# Patient Record
Sex: MALE | Race: OTHER | NOT HISPANIC OR LATINO | Employment: FULL TIME | URBAN - METROPOLITAN AREA
[De-identification: names, ages, dates, MRNs, and addresses within clinical notes are randomized per-mention and may not be internally consistent; named-entity substitution may affect disease eponyms.]

---

## 2020-01-16 ENCOUNTER — TELEPHONE (OUTPATIENT)
Dept: SCHEDULING | Facility: IMAGING CENTER | Age: 55
End: 2020-01-16

## 2020-02-26 ENCOUNTER — TELEPHONE (OUTPATIENT)
Dept: MEDICAL GROUP | Facility: PHYSICIAN GROUP | Age: 55
End: 2020-02-26

## 2020-02-26 ENCOUNTER — HOSPITAL ENCOUNTER (OUTPATIENT)
Dept: LAB | Facility: MEDICAL CENTER | Age: 55
End: 2020-02-26
Attending: INTERNAL MEDICINE
Payer: COMMERCIAL

## 2020-02-26 ENCOUNTER — OFFICE VISIT (OUTPATIENT)
Dept: MEDICAL GROUP | Facility: PHYSICIAN GROUP | Age: 55
End: 2020-02-26
Payer: COMMERCIAL

## 2020-02-26 VITALS
OXYGEN SATURATION: 97 % | TEMPERATURE: 98.5 F | HEART RATE: 69 BPM | SYSTOLIC BLOOD PRESSURE: 118 MMHG | HEIGHT: 69 IN | DIASTOLIC BLOOD PRESSURE: 80 MMHG | WEIGHT: 199.8 LBS | BODY MASS INDEX: 29.59 KG/M2

## 2020-02-26 DIAGNOSIS — E11.9 TYPE 2 DIABETES MELLITUS WITHOUT COMPLICATION, WITHOUT LONG-TERM CURRENT USE OF INSULIN (HCC): ICD-10-CM

## 2020-02-26 LAB
ALBUMIN SERPL BCP-MCNC: 4.1 G/DL (ref 3.2–4.9)
ALBUMIN/GLOB SERPL: 1.2 G/DL
ALP SERPL-CCNC: 68 U/L (ref 30–99)
ALT SERPL-CCNC: 18 U/L (ref 2–50)
ANION GAP SERPL CALC-SCNC: 10 MMOL/L (ref 0–11.9)
AST SERPL-CCNC: 18 U/L (ref 12–45)
BASOPHILS # BLD AUTO: 0.8 % (ref 0–1.8)
BASOPHILS # BLD: 0.07 K/UL (ref 0–0.12)
BILIRUB SERPL-MCNC: 0.3 MG/DL (ref 0.1–1.5)
BUN SERPL-MCNC: 15 MG/DL (ref 8–22)
CALCIUM SERPL-MCNC: 9.2 MG/DL (ref 8.5–10.5)
CHLORIDE SERPL-SCNC: 108 MMOL/L (ref 96–112)
CHOLEST SERPL-MCNC: 127 MG/DL (ref 100–199)
CO2 SERPL-SCNC: 22 MMOL/L (ref 20–33)
CREAT SERPL-MCNC: 0.96 MG/DL (ref 0.5–1.4)
CREAT UR-MCNC: 160.5 MG/DL
EOSINOPHIL # BLD AUTO: 0.32 K/UL (ref 0–0.51)
EOSINOPHIL NFR BLD: 3.9 % (ref 0–6.9)
ERYTHROCYTE [DISTWIDTH] IN BLOOD BY AUTOMATED COUNT: 42.3 FL (ref 35.9–50)
EST. AVERAGE GLUCOSE BLD GHB EST-MCNC: 177 MG/DL
FASTING STATUS PATIENT QL REPORTED: NORMAL
GLOBULIN SER CALC-MCNC: 3.4 G/DL (ref 1.9–3.5)
GLUCOSE SERPL-MCNC: 130 MG/DL (ref 65–99)
HBA1C MFR BLD: 7.8 % (ref 0–5.6)
HCT VFR BLD AUTO: 48.4 % (ref 42–52)
HDLC SERPL-MCNC: 35 MG/DL
HGB BLD-MCNC: 16.3 G/DL (ref 14–18)
IMM GRANULOCYTES # BLD AUTO: 0.01 K/UL (ref 0–0.11)
IMM GRANULOCYTES NFR BLD AUTO: 0.1 % (ref 0–0.9)
LDLC SERPL CALC-MCNC: 72 MG/DL
LYMPHOCYTES # BLD AUTO: 2.57 K/UL (ref 1–4.8)
LYMPHOCYTES NFR BLD: 31 % (ref 22–41)
MCH RBC QN AUTO: 30.1 PG (ref 27–33)
MCHC RBC AUTO-ENTMCNC: 33.7 G/DL (ref 33.7–35.3)
MCV RBC AUTO: 89.3 FL (ref 81.4–97.8)
MICROALBUMIN UR-MCNC: 1.3 MG/DL
MICROALBUMIN/CREAT UR: 8 MG/G (ref 0–30)
MONOCYTES # BLD AUTO: 0.54 K/UL (ref 0–0.85)
MONOCYTES NFR BLD AUTO: 6.5 % (ref 0–13.4)
NEUTROPHILS # BLD AUTO: 4.77 K/UL (ref 1.82–7.42)
NEUTROPHILS NFR BLD: 57.7 % (ref 44–72)
NRBC # BLD AUTO: 0 K/UL
NRBC BLD-RTO: 0 /100 WBC
PLATELET # BLD AUTO: 242 K/UL (ref 164–446)
PMV BLD AUTO: 10.1 FL (ref 9–12.9)
POTASSIUM SERPL-SCNC: 4.1 MMOL/L (ref 3.6–5.5)
PROT SERPL-MCNC: 7.5 G/DL (ref 6–8.2)
RBC # BLD AUTO: 5.42 M/UL (ref 4.7–6.1)
SODIUM SERPL-SCNC: 140 MMOL/L (ref 135–145)
TRIGL SERPL-MCNC: 100 MG/DL (ref 0–149)
WBC # BLD AUTO: 8.3 K/UL (ref 4.8–10.8)

## 2020-02-26 PROCEDURE — 36415 COLL VENOUS BLD VENIPUNCTURE: CPT

## 2020-02-26 PROCEDURE — 85025 COMPLETE CBC W/AUTO DIFF WBC: CPT

## 2020-02-26 PROCEDURE — 82570 ASSAY OF URINE CREATININE: CPT

## 2020-02-26 PROCEDURE — 80061 LIPID PANEL: CPT

## 2020-02-26 PROCEDURE — 90732 PPSV23 VACC 2 YRS+ SUBQ/IM: CPT | Performed by: INTERNAL MEDICINE

## 2020-02-26 PROCEDURE — 83036 HEMOGLOBIN GLYCOSYLATED A1C: CPT

## 2020-02-26 PROCEDURE — 80053 COMPREHEN METABOLIC PANEL: CPT

## 2020-02-26 PROCEDURE — 99204 OFFICE O/P NEW MOD 45 MIN: CPT | Mod: 25 | Performed by: INTERNAL MEDICINE

## 2020-02-26 PROCEDURE — 82043 UR ALBUMIN QUANTITATIVE: CPT

## 2020-02-26 PROCEDURE — 90471 IMMUNIZATION ADMIN: CPT | Performed by: INTERNAL MEDICINE

## 2020-02-26 RX ORDER — METFORMIN HYDROCHLORIDE 1000 MG/1
1 TABLET, FILM COATED, EXTENDED RELEASE ORAL DAILY
Qty: 90 TAB | Refills: 1 | Status: SHIPPED | OUTPATIENT
Start: 2020-02-26 | End: 2020-02-26

## 2020-02-26 NOTE — TELEPHONE ENCOUNTER
1. Caller Name: Octavio from [facility]    Call Back Number:[phone]          Pharmacist would like to know if they can change the metformin 1000 mg to the 500 mg due to the price for patient. Possibly send in a new prescription to metformin 500 mg taking 2 times a day.    Please advise.

## 2020-02-26 NOTE — PROGRESS NOTES
New Patient to establish    Chief Complaint   Patient presents with   • Establish Care       Subjective:     History of Present Illness: Patient is a 54 y.o. male who is here today to establish primary care    1. Type 2 diabetes mellitus without complication, without long-term current use of insulin ([facility])  -Patient diagnosed in 2016, check blood glucose before breakfast range from low 100s to 130s 1-2 times a week  -Not sure about his last A1c, have not done labs for more than a year  - Breakfast with white ache (no ADR), bread, tea and whole milk  -Lunch with chicken breast, vegetable, tortilla flowers  -Dinner with tortilla flour, rice once a week  -No snack, drink only water and tea  - Patient follows up with Dr. Hampton  ophthalmologist, patient is eye is fine, last time seen was 1 month ago  - Patient compliant with metformin 1000 mg daily  -Patient is not taking statin  -Patient does not complain of tingling numbness of the feet  -Patient deny kidney diseases    Current Outpatient Medications on File Prior to Visit   Medication Sig Dispense Refill   • ibuprofen (MOTRIN) 600 MG TABS Take 1 Tab by mouth every 6 hours as needed for Mild Pain. 30 Tab 0     No current facility-administered medications on file prior to visit.      No Known Allergies  Patient Active Problem List    Diagnosis Date Noted   • Type 2 diabetes mellitus without complication, without long-term current use of insulin ([facility]) 02/26/2020     Past Medical History:   Diagnosis Date   • Diabetes ([facility])      No past surgical history on file.  Family History   Problem Relation Age of Onset   • Diabetes Mother    • Lung Disease Father         asthma   • Diabetes Sister    • Diabetes Brother    • Lung Disease Maternal Grandmother         asthma     Social History     Tobacco Use   • Smoking status: Never Smoker   Substance Use Topics   • Alcohol use: No   • Drug use: No       ROS:     - Constitutional: Negative for fever, chills,    - Eye: Negative for  "blurry vision    -ENT: Negative for ear pain    - Respiratory: Negative for cough, hemoptysis    - Cardiovascular: Negative for chest pain     - Gastrointestinal: Negative for abdominal pain    - Genitourinary: Negative for dysuria    - Musculoskeletal: Negative for joint swelling    - Skin: Negative for itching    - Neurological: Negative for focal weakness     - Psychiatric/Behavioral: Negative for depression        Physical Exam:     /80 (BP Location: Left arm, Patient Position: Sitting, BP Cuff Size: Large adult)   Pulse 69   Temp 36.9 °C (98.5 °F) (Temporal)   Ht 1.753 m (5' 9\")   Wt 90.6 kg (199 lb 12.8 oz)   SpO2 97%   BMI 29.51 kg/m²   General: Normal appearing. No distress.  ENT: oropharynx without exudates.  Mallampati class III  Eyes: conjunctiva clear lids without ptosis  Pulmonary: Clear to ausculation.  Normal effort.   Cardiovascular: Regular rate and rhythm  Abdomen: Soft, nontender,  Lymph: No cervical or supraclavicular palpable lymph nodes  Psych: Normal mood and affect.  Monofilament testing with a 10 gram force: sensation intact: intact bilaterally  Visual Inspection: Feet without maceration, ulcers, fissures.  Pedal pulses: intact bilaterally       I have reviewed pertinent labs and diagnostic tests associated with this visit with specific comments listed under the assessment and plan below      Assessment and Plan:     1. Type 2 diabetes mellitus without complication, without long-term current use of insulin (HCC)  - CBC WITH DIFFERENTIAL; Future  - Comp Metabolic Panel; Future  - Diabetic Monofilament LE Exam  - HEMOGLOBIN A1C; Future  - Lipid Profile; Future  - MICROALBUMIN CREAT RATIO URINE; Future  - Pneumococal Polysaccharide Vaccine 23-Valent =>3YO SQ/IM  - metformin ER modified (GLUMETZA) 1000 MG TABLET SR 24 HR; Take 1 Tab by mouth every day.  Dispense: 90 Tab; Refill: 1      -Discussed diabetic diet in detail, educated regarding management of hypoglycemia, advised regular " exercise, educated disease management and weight goals as well as feet care and frequent check of feet.  -Patient to check early morning fasting blood glucose with goal discussed.  -Discussed side effects of medication. Patient confirmed understanding of information.    -Advised to sign to get records from ophthalmology p      Follow Up:      Return in about 4 weeks (around 3/25/2020) for weight issues and healthy lifestyle. DMII.    Please note that this dictation was created using voice recognition software. I have made every reasonable attempt to correct obvious errors, but I expect that there are errors of grammar and possibly content that I did not discover before finalizing the note.    Signed by: Jaquelin Hammer M.D.

## 2020-02-27 DIAGNOSIS — E11.9 TYPE 2 DIABETES MELLITUS WITHOUT COMPLICATION, WITHOUT LONG-TERM CURRENT USE OF INSULIN (HCC): ICD-10-CM

## 2020-02-27 NOTE — PROGRESS NOTES
1. Type 2 diabetes mellitus without complication, without long-term current use of insulin (HCC)  - metformin (GLUCOPHAGE) 1000 MG tablet; Take 1 Tab by mouth 2 times a day, with meals.  Dispense: 60 Tab; Refill: 3    A1c:   Lab Results   Component Value Date/Time    HBA1C 7.8 (H) 02/26/2020 0846    AVGLUC 177 02/26/2020 0846         Increase dose of metformin from 1000 daily to 1000 twice daily.

## 2020-02-27 NOTE — PROGRESS NOTES
Pharmacy suggested to change metformin 1000 extended release to 500 mg twice daily secondary to cost.    1. Type 2 diabetes mellitus without complication, without long-term current use of insulin (HCC)  - metFORMIN (GLUCOPHAGE) 500 MG Tab; Take 1 Tab by mouth 2 times a day, with meals.  Dispense: 180 Tab; Refill: 1

## 2020-04-02 ENCOUNTER — OFFICE VISIT (OUTPATIENT)
Dept: MEDICAL GROUP | Facility: PHYSICIAN GROUP | Age: 55
End: 2020-04-02
Payer: COMMERCIAL

## 2020-04-02 VITALS
HEIGHT: 69 IN | WEIGHT: 195 LBS | OXYGEN SATURATION: 98 % | HEART RATE: 74 BPM | DIASTOLIC BLOOD PRESSURE: 74 MMHG | TEMPERATURE: 98.4 F | SYSTOLIC BLOOD PRESSURE: 118 MMHG | BODY MASS INDEX: 28.88 KG/M2

## 2020-04-02 DIAGNOSIS — Z23 NEED FOR VACCINATION: ICD-10-CM

## 2020-04-02 DIAGNOSIS — E11.9 TYPE 2 DIABETES MELLITUS WITHOUT COMPLICATION, WITHOUT LONG-TERM CURRENT USE OF INSULIN (HCC): ICD-10-CM

## 2020-04-02 DIAGNOSIS — E66.3 OVERWEIGHT: ICD-10-CM

## 2020-04-02 PROCEDURE — 99214 OFFICE O/P EST MOD 30 MIN: CPT | Mod: 25 | Performed by: INTERNAL MEDICINE

## 2020-04-02 PROCEDURE — 90471 IMMUNIZATION ADMIN: CPT | Performed by: INTERNAL MEDICINE

## 2020-04-02 PROCEDURE — 90686 IIV4 VACC NO PRSV 0.5 ML IM: CPT | Performed by: INTERNAL MEDICINE

## 2020-04-02 ASSESSMENT — PATIENT HEALTH QUESTIONNAIRE - PHQ9: CLINICAL INTERPRETATION OF PHQ2 SCORE: 0

## 2020-04-02 ASSESSMENT — FIBROSIS 4 INDEX: FIB4 SCORE: 0.93

## 2020-04-02 NOTE — PATIENT INSTRUCTIONS
LIFESTYLE MEDICINE:       1) Make SMART lifestyle changes: The lifestyle changes that you need to make are with regards to: nutrition, cardiovascular exercise, sleep, stress management.         2) Nutrition:     1- Reduce carbohydrates to 5% or less, no added sugar at all, even try avoid hidden carbohydrates (including breads, pasta, rice, cereals/oatmeal, potato).  Avoid soda, processed carbs and sugars including cookies, candies, donuts, jellies, avoid all sugar beverages including avoiding diet soda/Gatorade, Powerade, Stu-Aid, ice tea.  Avoid all the sweeteners.    2- Eat a lot of vegetables (organic is much better to avoid herbicide/insecticide): make sure to eat like 7-10 cups of vegetables including green leafy vegetables, steamed or cooked with healthy oils such as olive oil, avocado oil or coconut oil.  Avoid vegetable oil (sunflower, soy, corn, canola).  Include a lot of cruciferous vegetables in your diets.    3-Try to avoid Fruits that has a lot of carbohydrate (including apples, banana, grapes), best fruits that are rich with vitamin/minerals as well as antioxidants are berries (you could take up to 1 cup of Berries a day). Avoid fruit juices ( even worse than SODA).     4-Eat healthy fat and meats from grass fed animals (grass fed cow meat with healthy fat, grass fed chicken/poultry with skin, wild-caught fish and seafood) as well as pasteurized eggs from grass fed chicken    5-when you eat dairy food, measures it is whole fat, avoid skimmed milk/free fat milk/2% milk    6-Avoid snacks between meals.  Highly recommend intermittent fasting.  You should eat only when you feel hungry.           3) Cardiovascular Exercise: The center for disease control recommends a minimum of 150 minutes per week of moderate intensity cardiovascular exercise for weight maintenance and cardiovascular health.  Set this as your initial goal, with at least 30 minutes per session. Types of exercise can include 30 minutes of  elliptical, 30 minutes of decently fast jog, 30 minutes of swimming, 30 minutes of heavy gardening (lifting big bags of fertilizer, digging deep holes/ditches).  You can cut down the minute requirements to half, by doing higher intensity sports such as a game of tennis, or soccer.        4) Sleep:    A) Goal: Obtain a minimum of 7-8hours of continuous, uninterrupted, restful sleep per night.    B) Tips for Sleep Hygiene:    I) Go to bed and wake up at consistent times whether work/school day or not.   II) Keep room dark, quiet, and comfortable.  Increase exposure to sunlight during awake times and avoid bright lights (especially anything with a backlight) at least the last 1-2hours before going to sleep.     III) Don't nap.     IV) Avoid stimulant or caffeine use more than 4 hours after wake time.      5) Stress Management: You cannot change the stresses of life necessarily, but you can change how he responds to them. One good way to manage stress is to write things down in order to help you process how to approach things in general or specifically. Another good way is to talk it out with someone you trusts, specifically your significant other or good friend. A definite great way to deal with stress is to have cardiovascular exercise!

## 2020-04-02 NOTE — PROGRESS NOTES
"Established Patient    Chief Complaint   Patient presents with   • Follow-Up     Healthy lifestyle and weight        Subjective:     HPI:   Dhruv presents today with the following.    2. Type 2 diabetes mellitus without complication, without long-term current use of insulin ([facility])  LIPID:  Lab Results   Component Value Date/Time    CHOLSTRLTOT 127 02/26/2020 08:46 AM    LDL 72 02/26/2020 08:46 AM    HDL 35 (A) 02/26/2020 08:46 AM    TRIGLYCERIDE 100 02/26/2020 08:46 AM       A1c:   Lab Results   Component Value Date/Time    HBA1C 7.8 (H) 02/26/2020 0846    AVGLUC 177 02/26/2020 0846     -Currently compliant with metformin 1000 twice daily  - Patient is not having hypoglycemic episodes, check blood glucose before breakfast and within the goal  -Patient seen by ophthalmologist February 2020 and result was good  Denied having other symptoms          Patient Active Problem List    Diagnosis Date Noted   • Type 2 diabetes mellitus without complication, without long-term current use of insulin ([facility]) 02/26/2020       Current Outpatient Medications on File Prior to Visit   Medication Sig Dispense Refill   • metformin (GLUCOPHAGE) 1000 MG tablet Take 1 Tab by mouth 2 times a day, with meals. 60 Tab 3     No current facility-administered medications on file prior to visit.        Allergies, past medical history, past surgical history, family history, social history reviewed and updated    ROS:     - Constitutional: Negative for fever, chills,    - Eye: Negative for blurry vision    - Cardiovascular: Negative for chest pain      Physical Exam:     /74 (BP Location: Left arm, Patient Position: Sitting, BP Cuff Size: Adult)   Pulse 74   Temp 36.9 °C (98.4 °F)   Ht 1.753 m (5' 9\")   Wt 88.5 kg (195 lb)   SpO2 98%   BMI 28.80 kg/m²   General: Normal appearing. No distress.  ENT: oropharynx without exudates.    Eyes: conjunctiva clear lids without ptosis  Pulmonary: Clear to ausculation.  Normal effort.   Cardiovascular: " Regular rate and rhythm  Abdomen: Soft, nontender,  Lymph: No cervical or supraclavicular palpable lymph nodes  Psych: Normal mood and affect.     I have reviewed pertinent labs and diagnostic tests associated with this visit with specific comments listed under the assessment and plan below      Assessment and Plan:     54 y.o. male with the following issues.    1. Need for vaccination  - Influenza Vaccine Quad Injection (PF)    2. Type 2 diabetes mellitus without complication, without long-term current use of insulin (HCC)  Obesity:  -Lengthy discussion regarding healthy dietary options including a lot of vegetable, reduce carb as much as she can as well as 0 added sugar, regular exercise as tolerated, healthy protein and fat  - Shown multiple pictures and figures in detail regarding healthy lifestyle changes and healthy dietary options  -Patient would like to start these lifestyle changes to improve diabetes/overweight  as well as dyslipidemia    -Discussed diabetic diet in detail, educated regarding management of hypoglycemia, advised regular exercise, educated disease management and weight goals as well as feet care and frequent check of feet.  -Patient to check early morning fasting blood glucose with goal discussed.    -Patient refused statin despite detailed explanations and benefit secondary to side effects.  Patient understand the risk.      Follow Up:      Return in about 3 months (around 7/2/2020) for follow up.    Please note that this dictation was created using voice recognition software. I have made every reasonable attempt to correct obvious errors, but I expect that there are errors of grammar and possibly content that I did not discover before finalizing the note.    Signed by: Jaquelin Hammer M.D.

## 2020-06-28 DIAGNOSIS — E11.9 TYPE 2 DIABETES MELLITUS WITHOUT COMPLICATION, WITHOUT LONG-TERM CURRENT USE OF INSULIN (HCC): ICD-10-CM

## 2020-06-30 NOTE — TELEPHONE ENCOUNTER
Last seen by PCP 04/2/2020.     Lab Results   Component Value Date/Time    HBA1C 7.8 (H) 02/26/2020 08:46 AM      Lab Results   Component Value Date/Time    MALBCRT 8 02/26/2020 08:46 AM    MICROALBUR 1.3 02/26/2020 08:46 AM      Lab Results   Component Value Date/Time    ALKPHOSPHAT 68 02/26/2020 08:46 AM    ASTSGOT 18 02/26/2020 08:46 AM    ALTSGPT 18 02/26/2020 08:46 AM    TBILIRUBIN 0.3 02/26/2020 08:46 AM        Will send 6 month(s) to the pharmacy.

## 2020-08-06 ENCOUNTER — OFFICE VISIT (OUTPATIENT)
Dept: MEDICAL GROUP | Facility: PHYSICIAN GROUP | Age: 55
End: 2020-08-06
Payer: COMMERCIAL

## 2020-08-06 VITALS
OXYGEN SATURATION: 96 % | TEMPERATURE: 97.8 F | DIASTOLIC BLOOD PRESSURE: 72 MMHG | HEIGHT: 69 IN | WEIGHT: 190.3 LBS | HEART RATE: 68 BPM | SYSTOLIC BLOOD PRESSURE: 118 MMHG | BODY MASS INDEX: 28.19 KG/M2

## 2020-08-06 DIAGNOSIS — E11.9 TYPE 2 DIABETES MELLITUS WITHOUT COMPLICATION, WITHOUT LONG-TERM CURRENT USE OF INSULIN (HCC): ICD-10-CM

## 2020-08-06 DIAGNOSIS — E66.3 OVERWEIGHT: ICD-10-CM

## 2020-08-06 LAB
HBA1C MFR BLD: 6.5 % (ref 0–5.6)
INT CON NEG: ABNORMAL
INT CON POS: ABNORMAL

## 2020-08-06 PROCEDURE — 99214 OFFICE O/P EST MOD 30 MIN: CPT | Performed by: INTERNAL MEDICINE

## 2020-08-06 PROCEDURE — 83036 HEMOGLOBIN GLYCOSYLATED A1C: CPT | Performed by: INTERNAL MEDICINE

## 2020-08-06 ASSESSMENT — FIBROSIS 4 INDEX: FIB4 SCORE: 0.93

## 2020-08-06 NOTE — PROGRESS NOTES
"Established Patient    Chief Complaint   Patient presents with   • Follow-Up       Subjective:     HPI:   Dhruv presents today with the following.    1. Type 2 diabetes mellitus without complication, without long-term current use of insulin ([facility])  2. Overweight  A1c:   Lab Results   Component Value Date/Time    HBA1C 6.5 (A) 08/06/2020 0810    HBA1C 7.8 (H) 02/26/2020 0846    AVGLUC 177 02/26/2020 0846   >> Both A1c as well as weight is improved,  -Patient is compliant with healthy lifestyle, healthy diet, losing weight, regular exercise  -Mention check blood glucose before breakfast and is always in 90s or low 100s  - He is compliant with metformin 1000 twice daily  -Patient otherwise denied having symptoms    Patient Active Problem List    Diagnosis Date Noted   • Overweight 04/02/2020   • Type 2 diabetes mellitus without complication, without long-term current use of insulin ([facility]) 02/26/2020       No current outpatient medications on file prior to visit.     No current facility-administered medications on file prior to visit.        Allergies, past medical history, past surgical history, family history, social history reviewed and updated    ROS:     - Constitutional: Negative for fever, chills,    - Eye: Negative for blurry vision    - Cardiovascular: Negative for chest pain      Physical Exam:     /72 (BP Location: Left arm, Patient Position: Sitting, BP Cuff Size: Adult)   Pulse 68   Temp 36.6 °C (97.8 °F) (Temporal)   Ht 1.753 m (5' 9\")   Wt 86.3 kg (190 lb 4.8 oz)   SpO2 96%   BMI 28.10 kg/m²   General: Normal appearing. No distress.  ENT: oropharynx without exudates.    Eyes: conjunctiva clear lids without ptosis  Pulmonary: Clear to ausculation.  Normal effort.   Cardiovascular: Regular rate and rhythm  Abdomen: Soft, nontender,  Lymph: No cervical or supraclavicular palpable lymph nodes  Psych: Normal mood and affect.     I have reviewed pertinent labs and diagnostic tests associated with this " visit with specific comments listed under the assessment and plan below      Assessment and Plan:     54 y.o. male with the following issues.    1. Type 2 diabetes mellitus without complication, without long-term current use of insulin ([facility])  2. Overweight  - metformin (GLUCOPHAGE) 1000 MG tablet; TAKE 1 TABLET BY MOUTH TWICE DAILY WITH MEALS  Dispense: 180 Tab; Refill: 1  - POCT Hemoglobin A1C>> 6.5    -Discussed diabetic diet in detail, educated regarding management of hypoglycemia, advised regular exercise, educated disease management and weight goals as well as feet care and frequent check of feet.  -Patient to check early morning fasting blood glucose with goal discussed.  -Continue check blood glucose at home      >> Patient refused vaccinations, colonoscopy, FOBT, other health screening    Follow Up:      Return in about 6 months (around 2/6/2021) for follow up.    Please note that this dictation was created using voice recognition software. I have made every reasonable attempt to correct obvious errors, but I expect that there are errors of grammar and possibly content that I did not discover before finalizing the note.    Signed by: Jaquelin Hammer M.D.

## 2020-11-09 PROCEDURE — 99283 EMERGENCY DEPT VISIT LOW MDM: CPT

## 2020-11-10 ENCOUNTER — APPOINTMENT (OUTPATIENT)
Dept: RADIOLOGY | Facility: MEDICAL CENTER | Age: 55
End: 2020-11-10
Attending: EMERGENCY MEDICINE
Payer: COMMERCIAL

## 2020-11-10 ENCOUNTER — TELEPHONE (OUTPATIENT)
Dept: MEDICAL GROUP | Facility: PHYSICIAN GROUP | Age: 55
End: 2020-11-10

## 2020-11-10 ENCOUNTER — HOSPITAL ENCOUNTER (EMERGENCY)
Facility: MEDICAL CENTER | Age: 55
End: 2020-11-10
Attending: EMERGENCY MEDICINE
Payer: COMMERCIAL

## 2020-11-10 VITALS
RESPIRATION RATE: 18 BRPM | BODY MASS INDEX: 30.22 KG/M2 | DIASTOLIC BLOOD PRESSURE: 82 MMHG | HEART RATE: 65 BPM | WEIGHT: 188 LBS | HEIGHT: 66 IN | SYSTOLIC BLOOD PRESSURE: 140 MMHG | OXYGEN SATURATION: 97 % | TEMPERATURE: 97.5 F

## 2020-11-10 DIAGNOSIS — R05.9 COUGH: ICD-10-CM

## 2020-11-10 DIAGNOSIS — J06.9 VIRAL URI WITH COUGH: ICD-10-CM

## 2020-11-10 LAB
COVID ORDER STATUS COVID19: NORMAL
SARS-COV-2 RNA RESP QL NAA+PROBE: DETECTED
SPECIMEN SOURCE: ABNORMAL

## 2020-11-10 PROCEDURE — 71045 X-RAY EXAM CHEST 1 VIEW: CPT

## 2020-11-10 PROCEDURE — U0003 INFECTIOUS AGENT DETECTION BY NUCLEIC ACID (DNA OR RNA); SEVERE ACUTE RESPIRATORY SYNDROME CORONAVIRUS 2 (SARS-COV-2) (CORONAVIRUS DISEASE [COVID-19]), AMPLIFIED PROBE TECHNIQUE, MAKING USE OF HIGH THROUGHPUT TECHNOLOGIES AS DESCRIBED BY CMS-2020-01-R: HCPCS

## 2020-11-10 PROCEDURE — 700102 HCHG RX REV CODE 250 W/ 637 OVERRIDE(OP): Performed by: EMERGENCY MEDICINE

## 2020-11-10 PROCEDURE — A9270 NON-COVERED ITEM OR SERVICE: HCPCS | Performed by: EMERGENCY MEDICINE

## 2020-11-10 RX ORDER — BENZONATATE 100 MG/1
100 CAPSULE ORAL ONCE
Status: COMPLETED | OUTPATIENT
Start: 2020-11-10 | End: 2020-11-10

## 2020-11-10 RX ORDER — BENZONATATE 100 MG/1
100 CAPSULE ORAL 3 TIMES DAILY PRN
Qty: 60 CAP | Refills: 0 | Status: SHIPPED | OUTPATIENT
Start: 2020-11-10 | End: 2021-06-03

## 2020-11-10 RX ADMIN — BENZONATATE 100 MG: 100 CAPSULE ORAL at 04:25

## 2020-11-10 ASSESSMENT — FIBROSIS 4 INDEX: FIB4 SCORE: 0.95

## 2020-11-10 NOTE — ED PROVIDER NOTES
"Scribed for Christoph Storey M.D. by Naomi Rowan. 11/10/2020  3:57 AM    Primary care provider: Jaquelin Hammer M.D.  Means of arrival: Walk-in  History obtained from: Patient  History limited by: None    CHIEF COMPLAINT  Chief Complaint   Patient presents with   • Cough     Dry non productive cough x 4 days, loss of tast     HPI  55 y.o. presents to the emergency department with a chief complaint of influenza like illness beginning 4 days ago.    Their symptoms include: cough and loss of taste. He denies having any vomiting, diarrhea, or leg swelling.   The following risk factors are present: None  Potential COVID-19 exposure history: NO Exposure history     REVIEW OF SYSTEMS  Pertinent positives include: cough and loss of taste. Pertinent negatives include: no vomiting, diarrhea, or leg swelling. See history of present illness. All other systems are negative.     PAST MEDICAL HISTORY   has a past medical history of Diabetes (HCC).    SOCIAL HISTORY  Social History     Tobacco Use   • Smoking status: Never Smoker   • Smokeless tobacco: Never Used   Substance and Sexual Activity   • Alcohol use: No   • Drug use: No   • Sexual activity: None noted      SURGICAL HISTORY  patient denies any surgical history    CURRENT MEDICATIONS  Home Medications    **Home medications have not yet been reviewed for this encounter**       ALLERGIES  No Known Allergies    PHYSICAL EXAM  VITAL SIGNS: /81   Pulse 85   Temp 36.7 °C (98 °F) (Temporal)   Resp 18   Ht 1.676 m (5' 6\")   Wt 85.3 kg (188 lb)   SpO2 95%   BMI 30.34 kg/m²    Genl: Sitting comfortably, speaking clearly, appears in no acute distress   Head: NC/AT   ENT: Mucous membranes moist, posterior pharynx clear, uvula midline, nares patent bilaterally   Eyes: Normal sclera, pupils equal round reactive to light  Neck: Supple, FROM, no obvious LAD appreciated   Pulmonary: Lungs are clear to auscultation bilaterally.   Chest: No TTP  CV:  no murmur " appreciated,  regular rate vs tachycardic  Abdomen: soft, NT/ND; no rebound/guarding, no masses palpated  : no CVA or suprapubic tenderness   Musculoskeletal: Pain free ROM of the neck. Moving upper and lower extremities and spontaneous in coordinated fashion  Neuro: A&Ox4 (person, place, time, situation), speech fluent, no focal deficits appreciated  Psych: Patient has an appropriate affect and behavior  Skin: No rash or lesions.  No pallor or jaundice.  No cyanosis.  Warm and dry.     COURSE & MEDICAL DECISION MAKING  DX-CHEST-PORTABLE (1 VIEW)   Final Result      Mild right basilar opacity may represent atelectasis and/or small infiltrate.          Labs Reviewed   COVID/SARS COV-2    Narrative:     Is patient being admitted?->No  Expected turn around time?->Routine (In-House PCR up to 24  hours)  Is this the patients First SARS CoV-2 test?->Yes  Is this patient employed in healthcare?->No  Is the patient symptomatic as defined by the CDC?->Yes  Date of symptom onset?->11/7/20  Is the patient hospitalized?->No  Is the patient a resident in a congregate care setting?->No  Is the patient pregnant?->No   SARS-COV-2, PCR (IN-HOUSE)    Narrative:     Is patient being admitted?->No  Expected turn around time?->Routine (In-House PCR up to 24  hours)  Is this the patients First SARS CoV-2 test?->Yes  Is this patient employed in healthcare?->No  Is the patient symptomatic as defined by the CDC?->Yes  Date of symptom onset?->11/7/20  Is the patient hospitalized?->No  Is the patient a resident in a congregate care setting?->No  Is the patient pregnant?->No       Pertinent Labs & Imaging studies reviewed. (See chart for details)    MDM  Number of Diagnoses or Management Options    1.) As of 11/10/20, I used the current [facility] PUI algorithm to help guide my medical decision making and testing.   I verified that the patient was wearing a mask and I was wearing appropriate PPE during each encounter with patient. The patient's  mask was on the patient at all times during my encounter except for a brief view of the oropharynx.    2.)Patient presents with symptoms concerning for influenza like illness as described above.  During the intial assessment it is noted that the pt is not in respiratory distress.  The constellation of symptoms are suggestive of viral illness with potential pneumonia, sepsis and unique infections due to current community outbreak.    Chest x-ray is obtained and reveals no evidence of pneumonia, pattern consistent with likely interstitial disease/viral etiology.      5:02 AM - Xray review by mean shows no ptx or pna    ________    3.) DISCHARGE:  The patient is appropriate for outpatient treatment this time. They are defined as lower risk using a combination of PSI/PORT score, physical exam and history of present illness.    I discussed with the patient that they likely have a viral illness and may have COVID-19.   Because your COVID-19 test result is pending, we discussed that the patient will need to remain in home quarantine until all three of the following are true:  1. You are 10 days from symptom onset  2. your symptoms are improving   3. you have been fever free for at least 72hours.    They agreed to return for worsening or persistent symptoms, significant or worsening shortness of breath or severe lightheadedness.     FINAL IMPRESSION  1. Cough    2. Viral URI with cough          Naomi VIEIRA (Scribkalpana), am scribing for, and in the presence of, Christoph Storey M.D..    Electronically signed by: Naomi Rowan (Leslie), 11/10/2020    IChristoph M.D. personally performed the services described in this documentation, as scribed by Naomi Rowan in my presence, and it is both accurate and complete.    C.     The note accurately reflects work and decisions made by me.  Christoph Storey M.D.  11/10/2020  8:26 AM

## 2020-11-10 NOTE — ED TRIAGE NOTES
"Chief Complaint   Patient presents with   • Cough     Dry non productive cough x 4 days, loss of tast     Non productive cough x 4 days w/out relief. Pt attempted home remedies PTA.    /93   Pulse (!) 110   Temp 36.6 °C (97.9 °F) (Temporal)   Resp 14   Ht 1.676 m (5' 6\")   Wt 85.3 kg (188 lb)   SpO2 99%   BMI 30.34 kg/m²   "

## 2020-11-10 NOTE — ED NOTES
Pt aox4, skin pink warm and dry, airway patent, rr even and unlabored, nad noted. No new complaints. Vss. Pt anxious to leave. Ambulates upon discharge without new incident or distress.

## 2020-11-10 NOTE — ED NOTES
Pt wanting to leave against medical advice, educated pt that we were waiting on chest x-ray results to be read from radiologist. Pt still does not want to wait. Dr. Storey aware.

## 2020-11-11 NOTE — ED NOTES
COVID-19 Test Follow-Up  11/11/20    Patient is positive for COVID-19.      SARS-CoV-2 Source   Date Value Ref Range Status   11/10/2020 NP Swab  Final     SARS-CoV-2 by PCR   Date Value Ref Range Status   11/10/2020 DETECTED (AA)  Final     Comment:     **The [employer] COVID-19 SARS-CoV-2 test has been made available for use under  the Emergency Use Authorization (EUA) only.       I have informed the patient of the positive result by phone and that the Health Dept would be in contact soon. Instructed them to continue to quarantine and self-isolate according with the CDC guidance or as otherwise directed by the Health Dept.    Per the CDC, they should continue to self-isolate until:   • At least 10 days since symptoms first appeared and  • At least 24 hours with no fever without fever-reducing medication and  • Other symptoms of COVID-19 are improving (Loss of taste and smell may persist for weeks or months after recovery and need not delay the end of isolation)  He states that he is feeling well.  He is advised to return to the ER for worsening symptoms including difficulty breathing, ongoing fever, weakness or chest pain.    Allie Garcia, PharmD

## 2020-11-11 NOTE — TELEPHONE ENCOUNTER
----- Message from Jaquelin Hammer M.D. sent at 11/10/2020  2:16 PM PST -----  Patient was in ER, COVID-19 test positive, please call patient advise for isolation, as well as call [facility] for follow-up, please inform patient to visit ER if there is worsening of shortness of breath, or other serious symptoms that need to be seen by ER physician.  Thanks  ----- Message -----  From: Dalila, Lab  Sent: 11/10/2020   4:45 AM PST  To: Jaquelin Hammer M.D.

## 2020-11-12 NOTE — TELEPHONE ENCOUNTER
Tried calling pt but no answer, pt has been notified via [employer] and we have also sent a message to contact the office. He has read those messages but no response.

## 2020-12-08 DIAGNOSIS — E11.9 TYPE 2 DIABETES MELLITUS WITHOUT COMPLICATION, WITHOUT LONG-TERM CURRENT USE OF INSULIN (HCC): ICD-10-CM

## 2020-12-08 NOTE — TELEPHONE ENCOUNTER
Patient has recently been seen by PCP within the last 6 months per protocol (8/20). Will refill medications for 6 months.  Lab Results   Component Value Date/Time    HBA1C 6.5 (A) 08/06/2020 08:10 AM      Lab Results   Component Value Date/Time    MALBCRT 8 02/26/2020 08:46 AM    MICROALBUR 1.3 02/26/2020 08:46 AM      Lab Results   Component Value Date/Time    ALKPHOSPHAT 68 02/26/2020 08:46 AM    ASTSGOT 18 02/26/2020 08:46 AM    ALTSGPT 18 02/26/2020 08:46 AM    TBILIRUBIN 0.3 02/26/2020 08:46 AM

## 2021-06-03 ENCOUNTER — OFFICE VISIT (OUTPATIENT)
Dept: MEDICAL GROUP | Facility: PHYSICIAN GROUP | Age: 56
End: 2021-06-03
Payer: COMMERCIAL

## 2021-06-03 VITALS
BODY MASS INDEX: 30.65 KG/M2 | TEMPERATURE: 97 F | WEIGHT: 190.7 LBS | HEIGHT: 66 IN | HEART RATE: 81 BPM | OXYGEN SATURATION: 95 % | SYSTOLIC BLOOD PRESSURE: 136 MMHG | DIASTOLIC BLOOD PRESSURE: 90 MMHG

## 2021-06-03 DIAGNOSIS — E66.9 OBESITY (BMI 30.0-34.9): ICD-10-CM

## 2021-06-03 DIAGNOSIS — E11.9 TYPE 2 DIABETES MELLITUS WITHOUT COMPLICATION, WITHOUT LONG-TERM CURRENT USE OF INSULIN (HCC): ICD-10-CM

## 2021-06-03 PROBLEM — E66.811 OBESITY (BMI 30.0-34.9): Status: ACTIVE | Noted: 2021-06-03

## 2021-06-03 PROBLEM — E66.3 OVERWEIGHT: Status: RESOLVED | Noted: 2020-04-02 | Resolved: 2021-06-03

## 2021-06-03 PROCEDURE — 99214 OFFICE O/P EST MOD 30 MIN: CPT | Performed by: INTERNAL MEDICINE

## 2021-06-03 ASSESSMENT — PATIENT HEALTH QUESTIONNAIRE - PHQ9: CLINICAL INTERPRETATION OF PHQ2 SCORE: 0

## 2021-06-03 ASSESSMENT — FIBROSIS 4 INDEX: FIB4 SCORE: 0.95

## 2021-06-03 NOTE — PROGRESS NOTES
"Established Patient    Chief Complaint   Patient presents with   • Annual Exam       Subjective:     HPI:   Dhruv presents today with the following.    1. Type 2 diabetes mellitus without complication, without long-term current use of insulin (HCC)  2. Obesity (BMI 30.0-34.9)  Patient is somewhat gained weight, he has access to regular diet, he is not specifically committed to diabetic diet, he does not have regular exercise as well, reported compliant with Metformin 1000 daily, patient denied having other related symptoms, denied any tenderness of the feet, mention he has ophthalmology appointment this year    Patient Active Problem List    Diagnosis Date Noted   • Obesity (BMI 30.0-34.9) 06/03/2021   • Type 2 diabetes mellitus without complication, without long-term current use of insulin (HCC) 02/26/2020       Current Outpatient Medications on File Prior to Visit   Medication Sig Dispense Refill   • metformin (GLUCOPHAGE) 1000 MG tablet TAKE 1 TABLET BY MOUTH TWICE DAILY WITH MEALS 180 Tab 1     No current facility-administered medications on file prior to visit.       Allergies, past medical history, past surgical history, family history, social history reviewed and updated    ROS:  All other systems reviewed and are negative except as stated in the HPI     Physical Exam:     /90 (BP Location: Right arm, Patient Position: Sitting, BP Cuff Size: Adult)   Pulse 81   Temp 36.1 °C (97 °F) (Temporal)   Ht 1.666 m (5' 5.59\")   Wt 86.5 kg (190 lb 11.2 oz)   SpO2 95%   BMI 31.16 kg/m²   General: Normal appearing. No distress.  ENT: oropharynx without exudates.    Eyes: conjunctiva clear lids without ptosis  Pulmonary: Clear to ausculation.  Normal effort.   Cardiovascular: Regular rate and rhythm  Abdomen: Soft, nontender,  Lymph: No cervical or supraclavicular palpable lymph nodes  Psych: Normal mood and affect.   Monofilament testing with a 10 gram force: sensation intact: intact bilaterally  Visual " Inspection: Feet without maceration, ulcers, fissures.  Pedal pulses: intact bilaterally    I have reviewed pertinent labs and diagnostic tests associated with this visit with specific comments listed under the assessment and plan below      Assessment and Plan:     55 y.o. male with the following issues.    1. Type 2 diabetes mellitus without complication, without long-term current use of insulin ([facility])  2. Obesity (BMI 30.0-34.9)  -Discussed diabetic diet in detail, educated regarding management of hypoglycemia, advised regular exercise, educated disease management and weight goals as well as feet care and frequent check of feet.  -Patient to check early morning fasting blood glucose with goal discussed.  - Lipid Profile; Future  - Hemoglobin A1C; Future  - Microalbumin Creat Ratio Urine (Lab Collect); Future  - Comp Metabolic Panel; Future  - VITAMIN D,25 HYDROXY; Future  - VITAMIN B12; Future  - TSH WITH REFLEX TO FT4; Future  - Diabetic Monofilament LE Exam    Follow Up:      Return in about 3 months (around 9/3/2021) for follow up.  Labs,  please note that this dictation was created using voice recognition software. I have made every reasonable attempt to correct obvious errors, but I expect that there are errors of grammar and possibly content that I did not discover before finalizing the note.    Signed by: Jaquelin Hammer M.D.

## 2021-06-03 NOTE — PATIENT INSTRUCTIONS
Natural vitamins from whole foods (fruit and vegetable)  Vitamin B complex supplement (methylcobalamin B12, methyl folate B9).   Magnesium glycinate supplement 400 mg daily  Vitamin D3 with K2 supplement   Essential oil supplement (cod liver oil)  Turmeric, kathya, Boswellia, black pepper, Cinnamon combination supplement

## 2021-07-25 DIAGNOSIS — E11.9 TYPE 2 DIABETES MELLITUS WITHOUT COMPLICATION, WITHOUT LONG-TERM CURRENT USE OF INSULIN (HCC): ICD-10-CM

## 2021-07-28 DIAGNOSIS — E11.9 TYPE 2 DIABETES MELLITUS WITHOUT COMPLICATION, WITHOUT LONG-TERM CURRENT USE OF INSULIN (HCC): ICD-10-CM

## 2021-10-18 DIAGNOSIS — E11.9 TYPE 2 DIABETES MELLITUS WITHOUT COMPLICATION, WITHOUT LONG-TERM CURRENT USE OF INSULIN (HCC): ICD-10-CM

## 2021-12-17 ENCOUNTER — HOSPITAL ENCOUNTER (OUTPATIENT)
Dept: LAB | Facility: MEDICAL CENTER | Age: 56
End: 2021-12-17
Attending: INTERNAL MEDICINE
Payer: COMMERCIAL

## 2021-12-17 DIAGNOSIS — E11.9 TYPE 2 DIABETES MELLITUS WITHOUT COMPLICATION, WITHOUT LONG-TERM CURRENT USE OF INSULIN (HCC): ICD-10-CM

## 2021-12-17 LAB
ALBUMIN SERPL BCP-MCNC: 4.6 G/DL (ref 3.2–4.9)
ALBUMIN/GLOB SERPL: 1.5 G/DL
ALP SERPL-CCNC: 84 U/L (ref 30–99)
ALT SERPL-CCNC: 24 U/L (ref 2–50)
ANION GAP SERPL CALC-SCNC: 10 MMOL/L (ref 7–16)
AST SERPL-CCNC: 20 U/L (ref 12–45)
BILIRUB SERPL-MCNC: 0.3 MG/DL (ref 0.1–1.5)
BUN SERPL-MCNC: 23 MG/DL (ref 8–22)
CALCIUM SERPL-MCNC: 9.6 MG/DL (ref 8.5–10.5)
CHLORIDE SERPL-SCNC: 106 MMOL/L (ref 96–112)
CHOLEST SERPL-MCNC: 144 MG/DL (ref 100–199)
CO2 SERPL-SCNC: 27 MMOL/L (ref 20–33)
CREAT SERPL-MCNC: 0.87 MG/DL (ref 0.5–1.4)
CREAT UR-MCNC: 83.88 MG/DL
EST. AVERAGE GLUCOSE BLD GHB EST-MCNC: 134 MG/DL
FASTING STATUS PATIENT QL REPORTED: NORMAL
GLOBULIN SER CALC-MCNC: 3 G/DL (ref 1.9–3.5)
GLUCOSE SERPL-MCNC: 112 MG/DL (ref 65–99)
HBA1C MFR BLD: 6.3 % (ref 4–5.6)
HDLC SERPL-MCNC: 41 MG/DL
LDLC SERPL CALC-MCNC: 87 MG/DL
MICROALBUMIN UR-MCNC: <1.2 MG/DL
MICROALBUMIN/CREAT UR: NORMAL MG/G (ref 0–30)
POTASSIUM SERPL-SCNC: 4.6 MMOL/L (ref 3.6–5.5)
PROT SERPL-MCNC: 7.6 G/DL (ref 6–8.2)
SODIUM SERPL-SCNC: 143 MMOL/L (ref 135–145)
T4 FREE SERPL-MCNC: 1.38 NG/DL (ref 0.93–1.7)
TRIGL SERPL-MCNC: 79 MG/DL (ref 0–149)
TSH SERPL DL<=0.005 MIU/L-ACNC: 9.54 UIU/ML (ref 0.38–5.33)
VIT B12 SERPL-MCNC: 571 PG/ML (ref 211–911)

## 2021-12-17 PROCEDURE — 82306 VITAMIN D 25 HYDROXY: CPT

## 2021-12-17 PROCEDURE — 82607 VITAMIN B-12: CPT

## 2021-12-17 PROCEDURE — 82043 UR ALBUMIN QUANTITATIVE: CPT

## 2021-12-17 PROCEDURE — 82570 ASSAY OF URINE CREATININE: CPT

## 2021-12-17 PROCEDURE — 36415 COLL VENOUS BLD VENIPUNCTURE: CPT

## 2021-12-17 PROCEDURE — 80053 COMPREHEN METABOLIC PANEL: CPT

## 2021-12-17 PROCEDURE — 83036 HEMOGLOBIN GLYCOSYLATED A1C: CPT

## 2021-12-17 PROCEDURE — 84439 ASSAY OF FREE THYROXINE: CPT

## 2021-12-17 PROCEDURE — 80061 LIPID PANEL: CPT

## 2021-12-17 PROCEDURE — 84443 ASSAY THYROID STIM HORMONE: CPT

## 2021-12-19 LAB — 25(OH)D3 SERPL-MCNC: 27 NG/ML (ref 30–80)

## 2021-12-20 DIAGNOSIS — R79.89 HIGH SERUM THYROID STIMULATING HORMONE (TSH): ICD-10-CM

## 2021-12-30 ENCOUNTER — OFFICE VISIT (OUTPATIENT)
Dept: MEDICAL GROUP | Facility: PHYSICIAN GROUP | Age: 56
End: 2021-12-30
Payer: COMMERCIAL

## 2021-12-30 VITALS
HEART RATE: 73 BPM | SYSTOLIC BLOOD PRESSURE: 122 MMHG | TEMPERATURE: 97 F | HEIGHT: 66 IN | OXYGEN SATURATION: 96 % | DIASTOLIC BLOOD PRESSURE: 84 MMHG | BODY MASS INDEX: 29.89 KG/M2 | WEIGHT: 186 LBS

## 2021-12-30 DIAGNOSIS — N52.9 ERECTILE DYSFUNCTION, UNSPECIFIED ERECTILE DYSFUNCTION TYPE: ICD-10-CM

## 2021-12-30 DIAGNOSIS — Z13.220 LIPID SCREENING: ICD-10-CM

## 2021-12-30 DIAGNOSIS — Z13.29 SCREENING FOR THYROID DISORDER: ICD-10-CM

## 2021-12-30 DIAGNOSIS — E11.9 TYPE 2 DIABETES MELLITUS WITHOUT COMPLICATION, WITHOUT LONG-TERM CURRENT USE OF INSULIN (HCC): ICD-10-CM

## 2021-12-30 DIAGNOSIS — Z13.21 ENCOUNTER FOR VITAMIN DEFICIENCY SCREENING: ICD-10-CM

## 2021-12-30 DIAGNOSIS — R79.89 ELEVATED TSH: ICD-10-CM

## 2021-12-30 DIAGNOSIS — Z00.00 HEALTH CARE MAINTENANCE: ICD-10-CM

## 2021-12-30 DIAGNOSIS — E66.9 OBESITY (BMI 30.0-34.9): ICD-10-CM

## 2021-12-30 PROCEDURE — 99214 OFFICE O/P EST MOD 30 MIN: CPT | Performed by: INTERNAL MEDICINE

## 2021-12-30 RX ORDER — SILDENAFIL 50 MG/1
50 TABLET, FILM COATED ORAL PRN
Qty: 10 TABLET | Refills: 3 | Status: SHIPPED | OUTPATIENT
Start: 2021-12-30 | End: 2022-04-23 | Stop reason: SDUPTHER

## 2021-12-30 ASSESSMENT — FIBROSIS 4 INDEX: FIB4 SCORE: 0.93

## 2021-12-30 NOTE — PROGRESS NOTES
"Established Patient    Chief Complaint   Patient presents with   • Follow-Up       Subjective:     HPI:   Dhruv presents today with the following.    Patient had lab with vitamin D deficiency, cholesterol within normal range, he is taking statin, also elevated TSH, he has been finished with rest of thyroid labs including antibodies    Also reported erectile dysfunction, he would like to have some Viagra    Patient Active Problem List    Diagnosis Date Noted   • ED (erectile dysfunction) 12/30/2021   • Obesity (BMI 30.0-34.9) 06/03/2021   • Type 2 diabetes mellitus without complication, without long-term current use of insulin (HCC) 02/26/2020       No current outpatient medications on file prior to visit.     No current facility-administered medications on file prior to visit.       Allergies, past medical history, past surgical history, family history, social history reviewed and updated    ROS:  All other systems reviewed and are negative except as stated in the HPI       Physical Exam:     /84 (BP Location: Left arm, Patient Position: Sitting, BP Cuff Size: Adult)   Pulse 73   Temp 36.1 °C (97 °F) (Temporal)   Ht 1.666 m (5' 5.59\")   Wt 84.4 kg (186 lb)   SpO2 96%   BMI 30.40 kg/m²   General: Normal appearing. No distress.  Pulmonary: Clear to ausculation.  Normal effort.   Cardiovascular: Regular rate and rhythm    Assessment and Plan:     56 y.o. male with the following issues.    1. Type 2 diabetes mellitus without complication, without long-term current use of insulin (HCC)  - metformin (GLUCOPHAGE) 1000 MG tablet; Take 1 Tablet by mouth 2 times a day with meals.  Dispense: 180 Tablet; Refill: 3  2. Erectile dysfunction, unspecified erectile dysfunction type  - sildenafil citrate (VIAGRA) 50 MG tablet; Take 1 Tablet by mouth as needed for Erectile Dysfunction.  Dispense: 10 Tablet; Refill: 3  3. Obesity (BMI 30.0-34.9)  Continue statin, reported will see ophthalmology next year March " 2022  -Educated regarding vitamin supplements    -Discussed diabetic diet in detail, educated regarding management of hypoglycemia, advised regular exercise, educated disease management and weight goals as well as feet care and frequent check of feet.  -Patient to check early morning fasting blood glucose with goal discussed.  - asked to have a BG log with daily fasting and 2 hr postprandial after biggest meal and bring to next visit or send through my chart  -Discussed side effects of medication. Patient confirmed understanding of information.            1. Type 2 diabetes mellitus without complication, without long-term current use of insulin (HCC)  - metformin (GLUCOPHAGE) 1000 MG tablet; Take 1 Tablet by mouth 2 times a day with meals.  Dispense: 180 Tablet; Refill: 3  - Comp Metabolic Panel; Future  - CRP HIGH SENSITIVE (CARDIAC); Future  - HEMOGLOBIN A1C; Future  - MAGNESIUM; Future  - MICROALBUMIN CREAT RATIO URINE; Future    2. Erectile dysfunction, unspecified erectile dysfunction type  - sildenafil citrate (VIAGRA) 50 MG tablet; Take 1 Tablet by mouth as needed for Erectile Dysfunction.  Dispense: 10 Tablet; Refill: 3  - MAGNESIUM; Future    3. Obesity (BMI 30.0-34.9)  - MAGNESIUM; Future  4. Elevated TSH  - FREE THYROXINE; Future  - TSH; Future  - THYROID PEROXIDASE  (TPO) AB; Future  - T3 FREE; Future    6. Encounter for vitamin deficiency screening  - VITAMIN D,25 HYDROXY; Future  - VITAMIN B12; Future    7. Health care maintenance  8. Lipid screening  - LipoFit by NMR; Future    Labs to be done in June 2022    Follow Up:      Return in about 6 months (around 6/30/2022).    Please note that this dictation was created using voice recognition software. I have made every reasonable attempt to correct obvious errors, but I expect that there are errors of grammar and possibly content that I did not discover before finalizing the note.    Signed by: Jaquelin Hammer M.D.

## 2022-04-23 DIAGNOSIS — N52.9 ERECTILE DYSFUNCTION, UNSPECIFIED ERECTILE DYSFUNCTION TYPE: ICD-10-CM

## 2022-04-25 RX ORDER — SILDENAFIL 50 MG/1
50 TABLET, FILM COATED ORAL PRN
Qty: 10 TABLET | Refills: 3 | Status: SHIPPED | OUTPATIENT
Start: 2022-04-25 | End: 2022-11-14 | Stop reason: SDUPTHER

## 2022-09-01 ENCOUNTER — HOSPITAL ENCOUNTER (OUTPATIENT)
Dept: LAB | Facility: MEDICAL CENTER | Age: 57
End: 2022-09-01
Attending: INTERNAL MEDICINE
Payer: COMMERCIAL

## 2022-09-01 DIAGNOSIS — R79.89 HIGH SERUM THYROID STIMULATING HORMONE (TSH): ICD-10-CM

## 2022-09-01 DIAGNOSIS — Z13.21 ENCOUNTER FOR VITAMIN DEFICIENCY SCREENING: ICD-10-CM

## 2022-09-01 DIAGNOSIS — R79.89 ELEVATED TSH: ICD-10-CM

## 2022-09-01 DIAGNOSIS — N52.9 ERECTILE DYSFUNCTION, UNSPECIFIED ERECTILE DYSFUNCTION TYPE: ICD-10-CM

## 2022-09-01 DIAGNOSIS — Z13.220 LIPID SCREENING: ICD-10-CM

## 2022-09-01 DIAGNOSIS — E66.9 OBESITY (BMI 30.0-34.9): ICD-10-CM

## 2022-09-01 DIAGNOSIS — E11.9 TYPE 2 DIABETES MELLITUS WITHOUT COMPLICATION, WITHOUT LONG-TERM CURRENT USE OF INSULIN (HCC): ICD-10-CM

## 2022-09-01 LAB
25(OH)D3 SERPL-MCNC: 40 NG/ML (ref 30–100)
ALBUMIN SERPL BCP-MCNC: 4.2 G/DL (ref 3.2–4.9)
ALBUMIN/GLOB SERPL: 1.5 G/DL
ALP SERPL-CCNC: 71 U/L (ref 30–99)
ALT SERPL-CCNC: 38 U/L (ref 2–50)
ANION GAP SERPL CALC-SCNC: 11 MMOL/L (ref 7–16)
AST SERPL-CCNC: 28 U/L (ref 12–45)
BILIRUB SERPL-MCNC: 0.2 MG/DL (ref 0.1–1.5)
BUN SERPL-MCNC: 19 MG/DL (ref 8–22)
CALCIUM SERPL-MCNC: 9.2 MG/DL (ref 8.5–10.5)
CHLORIDE SERPL-SCNC: 107 MMOL/L (ref 96–112)
CO2 SERPL-SCNC: 23 MMOL/L (ref 20–33)
CREAT SERPL-MCNC: 0.8 MG/DL (ref 0.5–1.4)
CREAT UR-MCNC: 64.38 MG/DL
CRP SERPL HS-MCNC: 1.6 MG/L (ref 0–3)
EST. AVERAGE GLUCOSE BLD GHB EST-MCNC: 137 MG/DL
GFR SERPLBLD CREATININE-BSD FMLA CKD-EPI: 104 ML/MIN/1.73 M 2
GLOBULIN SER CALC-MCNC: 2.8 G/DL (ref 1.9–3.5)
GLUCOSE SERPL-MCNC: 114 MG/DL (ref 65–99)
HBA1C MFR BLD: 6.4 % (ref 4–5.6)
MAGNESIUM SERPL-MCNC: 1.8 MG/DL (ref 1.5–2.5)
MICROALBUMIN UR-MCNC: <1.2 MG/DL
MICROALBUMIN/CREAT UR: NORMAL MG/G (ref 0–30)
POTASSIUM SERPL-SCNC: 4.1 MMOL/L (ref 3.6–5.5)
PROT SERPL-MCNC: 7 G/DL (ref 6–8.2)
SODIUM SERPL-SCNC: 141 MMOL/L (ref 135–145)
T3FREE SERPL-MCNC: 3.01 PG/ML (ref 2–4.4)
T4 FREE SERPL-MCNC: 1.04 NG/DL (ref 0.93–1.7)
THYROPEROXIDASE AB SERPL-ACNC: 9 IU/ML (ref 0–9)
TSH SERPL DL<=0.005 MIU/L-ACNC: 11.6 UIU/ML (ref 0.38–5.33)
VIT B12 SERPL-MCNC: 553 PG/ML (ref 211–911)

## 2022-09-01 PROCEDURE — 82043 UR ALBUMIN QUANTITATIVE: CPT

## 2022-09-01 PROCEDURE — 83704 LIPOPROTEIN BLD QUAN PART: CPT

## 2022-09-01 PROCEDURE — 83036 HEMOGLOBIN GLYCOSYLATED A1C: CPT

## 2022-09-01 PROCEDURE — 84439 ASSAY OF FREE THYROXINE: CPT

## 2022-09-01 PROCEDURE — 82570 ASSAY OF URINE CREATININE: CPT

## 2022-09-01 PROCEDURE — 86376 MICROSOMAL ANTIBODY EACH: CPT

## 2022-09-01 PROCEDURE — 84443 ASSAY THYROID STIM HORMONE: CPT

## 2022-09-01 PROCEDURE — 86141 C-REACTIVE PROTEIN HS: CPT

## 2022-09-01 PROCEDURE — 36415 COLL VENOUS BLD VENIPUNCTURE: CPT

## 2022-09-01 PROCEDURE — 80061 LIPID PANEL: CPT

## 2022-09-01 PROCEDURE — 83735 ASSAY OF MAGNESIUM: CPT

## 2022-09-01 PROCEDURE — 80053 COMPREHEN METABOLIC PANEL: CPT

## 2022-09-01 PROCEDURE — 84482 T3 REVERSE: CPT

## 2022-09-01 PROCEDURE — 84481 FREE ASSAY (FT-3): CPT

## 2022-09-01 PROCEDURE — 82306 VITAMIN D 25 HYDROXY: CPT

## 2022-09-01 PROCEDURE — 82607 VITAMIN B-12: CPT

## 2022-09-04 LAB
CHOLEST SERPL-MCNC: 142 MG/DL
HDL PARTICAL NO Q4363: 28.6 UMOL/L
HDL SIZE Q4361: 8.5 NM
HDLC SERPL-MCNC: 38 MG/DL (ref 40–59)
HLD.LARGE SERPL-SCNC: <2.8 UMOL/L
L VLDL PART NO Q4357: 2.6 NMOL/L
LDL SERPL QN: 20.6 NM
LDL SERPL-SCNC: 1223 NMOL/L
LDL SMALL SERPL-SCNC: 723 NMOL/L
LDLC SERPL CALC-MCNC: 75 MG/DL
PATHOLOGY STUDY: ABNORMAL
TRIGL SERPL-MCNC: 144 MG/DL (ref 30–149)
VLDL SIZE Q4362: 46.9 NM

## 2022-09-08 ENCOUNTER — OFFICE VISIT (OUTPATIENT)
Dept: MEDICAL GROUP | Facility: PHYSICIAN GROUP | Age: 57
End: 2022-09-08
Payer: COMMERCIAL

## 2022-09-08 VITALS
TEMPERATURE: 98.1 F | WEIGHT: 190 LBS | HEART RATE: 85 BPM | HEIGHT: 66 IN | RESPIRATION RATE: 14 BRPM | OXYGEN SATURATION: 98 % | BODY MASS INDEX: 30.53 KG/M2 | SYSTOLIC BLOOD PRESSURE: 132 MMHG | DIASTOLIC BLOOD PRESSURE: 80 MMHG

## 2022-09-08 DIAGNOSIS — E66.9 OBESITY (BMI 30.0-34.9): ICD-10-CM

## 2022-09-08 DIAGNOSIS — R79.89 ELEVATED TSH: ICD-10-CM

## 2022-09-08 DIAGNOSIS — I83.93 ASYMPTOMATIC VARICOSE VEINS OF BOTH LOWER EXTREMITIES: ICD-10-CM

## 2022-09-08 DIAGNOSIS — E11.9 TYPE 2 DIABETES MELLITUS WITHOUT COMPLICATION, WITHOUT LONG-TERM CURRENT USE OF INSULIN (HCC): ICD-10-CM

## 2022-09-08 LAB — T3REVERSE SERPL-MCNC: 13.9 NG/DL (ref 9–27)

## 2022-09-08 PROCEDURE — 99214 OFFICE O/P EST MOD 30 MIN: CPT | Performed by: INTERNAL MEDICINE

## 2022-09-08 ASSESSMENT — PATIENT HEALTH QUESTIONNAIRE - PHQ9: CLINICAL INTERPRETATION OF PHQ2 SCORE: 0

## 2022-09-08 NOTE — PROGRESS NOTES
"Established Patient    Chief Complaint   Patient presents with    Follow-Up       Subjective:     HPI:   Dhruv presents today with the following.    Patient Active Problem List    Diagnosis Date Noted    Asymptomatic varicose veins of both lower extremities 09/08/2022    ED (erectile dysfunction) 12/30/2021    Elevated TSH 12/30/2021    Obesity (BMI 30.0-34.9) 06/03/2021    Type 2 diabetes mellitus without complication, without long-term current use of insulin ([facility]) 02/26/2020       Current Outpatient Medications on File Prior to Visit   Medication Sig Dispense Refill    sildenafil citrate (VIAGRA) 50 MG tablet Take 1 Tablet by mouth as needed for Erectile Dysfunction. 10 Tablet 3    metformin (GLUCOPHAGE) 1000 MG tablet Take 1 Tablet by mouth 2 times a day with meals. 180 Tablet 3     No current facility-administered medications on file prior to visit.       Allergies, past medical history, past surgical history, family history, social history reviewed and updated    ROS:  All other systems reviewed and are negative except as stated in the HPI       Physical Exam:     /80 (BP Location: Right arm, Patient Position: Sitting, BP Cuff Size: Adult)   Pulse 85   Temp 36.7 °C (98.1 °F) (Temporal)   Resp 14   Ht 1.666 m (5' 5.59\")   Wt 86.2 kg (190 lb)   SpO2 98%   BMI 31.05 kg/m²   General: Normal appearing. No distress.  Pulmonary: Clear to ausculation.  Normal effort.   Cardiovascular: Regular rate and rhythm    Assessment and Plan:     57 y.o. male with the following issues.    1. Type 2 diabetes mellitus without complication, without long-term current use of insulin ([facility])  A1c 6.4, take metformin 1000 twice daily, add Jardiance, continue follow-up with ophthalmology  - Diabetic Monofilament Lower Extremity Exam  - Empagliflozin 10 MG Tab; Take 1 Tablet by mouth every day.  Dispense: 30 Tablet; Refill: 1    Monofilament testing with a 10 gram force: sensation intact: intact bilaterally  Visual Inspection: " Feet without maceration, ulcers, fissures.  Pedal pulses: intact bilaterally    -Discussed diabetic diet in detail, educated regarding management of hypoglycemia, advised regular exercise, educated disease management and weight goals as well as feet care and frequent check of feet.  -Patient to check early morning fasting blood glucose with goal discussed.  - asked to have a BG log with daily fasting and 2 hr postprandial after biggest meal and bring to next visit or send through my chart  -Discussed side effects of medication. Patient confirmed understanding of information.  > No statin for now, his LDL is in the goal    2. Obesity (BMI 30.0-34.9)    3. Asymptomatic varicose veins of both lower extremities  Educated in detail regarding conservative management    4. Elevated TSH  Patient denies having related symptoms  - FREE THYROXINE; Future  - T3 FREE; Future  - TSH; Future    Follow Up:      Return in about 3 months (around 12/8/2022) for labs.  Thyroid lab,    Please note that this dictation was created using voice recognition software. I have made every reasonable attempt to correct obvious errors, but I expect that there are errors of grammar and possibly content that I did not discover before finalizing the note.    Signed by: Jaquelin Hammer M.D.

## 2022-11-02 DIAGNOSIS — E11.9 TYPE 2 DIABETES MELLITUS WITHOUT COMPLICATION, WITHOUT LONG-TERM CURRENT USE OF INSULIN (HCC): ICD-10-CM

## 2022-11-03 RX ORDER — EMPAGLIFLOZIN 10 MG/1
TABLET, FILM COATED ORAL
Qty: 30 TABLET | Refills: 0 | Status: SHIPPED | OUTPATIENT
Start: 2022-11-03 | End: 2022-12-19 | Stop reason: SDUPTHER

## 2022-11-14 DIAGNOSIS — N52.9 ERECTILE DYSFUNCTION, UNSPECIFIED ERECTILE DYSFUNCTION TYPE: ICD-10-CM

## 2022-11-15 RX ORDER — SILDENAFIL 50 MG/1
50 TABLET, FILM COATED ORAL PRN
Qty: 10 TABLET | Refills: 0 | Status: SHIPPED | OUTPATIENT
Start: 2022-11-15 | End: 2023-01-28 | Stop reason: SDUPTHER

## 2022-12-19 DIAGNOSIS — E11.9 TYPE 2 DIABETES MELLITUS WITHOUT COMPLICATION, WITHOUT LONG-TERM CURRENT USE OF INSULIN (HCC): ICD-10-CM

## 2022-12-19 RX ORDER — EMPAGLIFLOZIN 10 MG/1
1 TABLET, FILM COATED ORAL DAILY
Qty: 30 TABLET | Refills: 0 | Status: SHIPPED | OUTPATIENT
Start: 2022-12-19 | End: 2023-01-31

## 2023-01-28 DIAGNOSIS — E11.9 TYPE 2 DIABETES MELLITUS WITHOUT COMPLICATION, WITHOUT LONG-TERM CURRENT USE OF INSULIN (HCC): ICD-10-CM

## 2023-01-28 DIAGNOSIS — N52.9 ERECTILE DYSFUNCTION, UNSPECIFIED ERECTILE DYSFUNCTION TYPE: ICD-10-CM

## 2023-01-30 DIAGNOSIS — N52.9 ERECTILE DYSFUNCTION, UNSPECIFIED ERECTILE DYSFUNCTION TYPE: ICD-10-CM

## 2023-01-30 DIAGNOSIS — E11.9 TYPE 2 DIABETES MELLITUS WITHOUT COMPLICATION, WITHOUT LONG-TERM CURRENT USE OF INSULIN (HCC): ICD-10-CM

## 2023-01-31 RX ORDER — EMPAGLIFLOZIN 10 MG/1
TABLET, FILM COATED ORAL
Qty: 30 TABLET | Refills: 0 | Status: SHIPPED | OUTPATIENT
Start: 2023-01-31

## 2023-01-31 RX ORDER — EMPAGLIFLOZIN 10 MG/1
1 TABLET, FILM COATED ORAL DAILY
Qty: 30 TABLET | Refills: 0 | Status: SHIPPED | OUTPATIENT
Start: 2023-01-31

## 2023-01-31 RX ORDER — SILDENAFIL 50 MG/1
50 TABLET, FILM COATED ORAL PRN
Qty: 10 TABLET | Refills: 0 | Status: SHIPPED | OUTPATIENT
Start: 2023-01-31

## 2023-02-01 RX ORDER — EMPAGLIFLOZIN 10 MG/1
1 TABLET, FILM COATED ORAL DAILY
Qty: 30 TABLET | Refills: 0 | Status: SHIPPED | OUTPATIENT
Start: 2023-02-01

## 2023-02-01 RX ORDER — SILDENAFIL 50 MG/1
50 TABLET, FILM COATED ORAL PRN
Qty: 10 TABLET | Refills: 0 | Status: SHIPPED | OUTPATIENT
Start: 2023-02-01

## 2023-02-09 ENCOUNTER — APPOINTMENT (OUTPATIENT)
Dept: MEDICAL GROUP | Facility: PHYSICIAN GROUP | Age: 58
End: 2023-02-09
Payer: COMMERCIAL

## 2023-04-20 ENCOUNTER — APPOINTMENT (OUTPATIENT)
Dept: MEDICAL GROUP | Facility: PHYSICIAN GROUP | Age: 58
End: 2023-04-20
Payer: COMMERCIAL

## 2023-05-23 ENCOUNTER — PATIENT MESSAGE (OUTPATIENT)
Dept: HEALTH INFORMATION MANAGEMENT | Facility: OTHER | Age: 58
End: 2023-05-23

## 2023-05-23 ENCOUNTER — DOCUMENTATION (OUTPATIENT)
Dept: HEALTH INFORMATION MANAGEMENT | Facility: OTHER | Age: 58
End: 2023-05-23
Payer: COMMERCIAL

## 2023-06-08 ENCOUNTER — APPOINTMENT (OUTPATIENT)
Dept: MEDICAL GROUP | Facility: PHYSICIAN GROUP | Age: 58
End: 2023-06-08
Payer: COMMERCIAL

## 2023-07-26 ENCOUNTER — DOCUMENTATION (OUTPATIENT)
Dept: HEALTH INFORMATION MANAGEMENT | Facility: OTHER | Age: 58
End: 2023-07-26
Payer: COMMERCIAL

## 2024-04-04 ENCOUNTER — HOSPITAL ENCOUNTER (OUTPATIENT)
Facility: MEDICAL CENTER | Age: 59
End: 2024-04-04
Payer: COMMERCIAL

## 2024-04-04 ENCOUNTER — OFFICE VISIT (OUTPATIENT)
Dept: MEDICAL GROUP | Facility: PHYSICIAN GROUP | Age: 59
End: 2024-04-04
Payer: COMMERCIAL

## 2024-04-04 VITALS
TEMPERATURE: 97.6 F | HEIGHT: 66 IN | BODY MASS INDEX: 25.23 KG/M2 | HEART RATE: 87 BPM | DIASTOLIC BLOOD PRESSURE: 92 MMHG | SYSTOLIC BLOOD PRESSURE: 142 MMHG | OXYGEN SATURATION: 100 % | WEIGHT: 157 LBS | RESPIRATION RATE: 20 BRPM

## 2024-04-04 DIAGNOSIS — Z12.5 ENCOUNTER FOR SCREENING FOR MALIGNANT NEOPLASM OF PROSTATE: ICD-10-CM

## 2024-04-04 DIAGNOSIS — Z13.29 THYROID DISORDER SCREEN: ICD-10-CM

## 2024-04-04 DIAGNOSIS — Z76.89 ENCOUNTER TO ESTABLISH CARE: ICD-10-CM

## 2024-04-04 DIAGNOSIS — N52.9 ERECTILE DYSFUNCTION, UNSPECIFIED ERECTILE DYSFUNCTION TYPE: ICD-10-CM

## 2024-04-04 DIAGNOSIS — E11.9 TYPE 2 DIABETES MELLITUS WITHOUT COMPLICATION, WITHOUT LONG-TERM CURRENT USE OF INSULIN (HCC): ICD-10-CM

## 2024-04-04 DIAGNOSIS — Z11.59 NEED FOR HEPATITIS C SCREENING TEST: ICD-10-CM

## 2024-04-04 DIAGNOSIS — Z13.0 SCREENING FOR DEFICIENCY ANEMIA: ICD-10-CM

## 2024-04-04 DIAGNOSIS — E55.9 VITAMIN D DEFICIENCY: ICD-10-CM

## 2024-04-04 PROBLEM — E66.9 OBESITY (BMI 30.0-34.9): Status: RESOLVED | Noted: 2021-06-03 | Resolved: 2024-04-04

## 2024-04-04 PROBLEM — E66.811 OBESITY (BMI 30.0-34.9): Status: RESOLVED | Noted: 2021-06-03 | Resolved: 2024-04-04

## 2024-04-04 LAB
CREAT UR-MCNC: 95.08 MG/DL
HBA1C MFR BLD: 6.4 % (ref ?–5.8)
MICROALBUMIN UR-MCNC: <1.2 MG/DL
MICROALBUMIN/CREAT UR: NORMAL MG/G (ref 0–30)
POCT INT CON NEG: NEGATIVE
POCT INT CON POS: POSITIVE

## 2024-04-04 PROCEDURE — 82043 UR ALBUMIN QUANTITATIVE: CPT

## 2024-04-04 PROCEDURE — 83036 HEMOGLOBIN GLYCOSYLATED A1C: CPT

## 2024-04-04 PROCEDURE — 99214 OFFICE O/P EST MOD 30 MIN: CPT

## 2024-04-04 PROCEDURE — 3077F SYST BP >= 140 MM HG: CPT

## 2024-04-04 PROCEDURE — 3080F DIAST BP >= 90 MM HG: CPT

## 2024-04-04 PROCEDURE — 82570 ASSAY OF URINE CREATININE: CPT

## 2024-04-04 RX ORDER — SILDENAFIL 50 MG/1
50 TABLET, FILM COATED ORAL PRN
Qty: 10 TABLET | Refills: 11 | Status: SHIPPED | OUTPATIENT
Start: 2024-04-04

## 2024-04-04 RX ORDER — VITAMIN B COMPLEX
1000 TABLET ORAL DAILY
COMMUNITY

## 2024-04-04 RX ORDER — MULTIVIT WITH MINERALS/LUTEIN
TABLET ORAL
COMMUNITY

## 2024-04-04 ASSESSMENT — PATIENT HEALTH QUESTIONNAIRE - PHQ9: CLINICAL INTERPRETATION OF PHQ2 SCORE: 0

## 2024-04-04 NOTE — ASSESSMENT & PLAN NOTE
Chronic, stable. Will get updated labs, poct A1c, retinal exam, uacr, monofilament today  Continue metformin 1000 mg BID

## 2024-04-04 NOTE — PROGRESS NOTES
Verbal consent was acquired by the patient to use [employer] ambient listening note generation during this visit     Subjective:     CC: Diagnoses of Type 2 diabetes mellitus without complication, without long-term current use of insulin (HCC), Encounter to establish care, Need for hepatitis C screening test, Vitamin D deficiency, Thyroid disorder screen, Screening for deficiency anemia, Erectile dysfunction, unspecified erectile dysfunction type, and Encounter for screening for malignant neoplasm of prostate were pertinent to this visit.    HPI:   Dhruv presents today with    History of Present Illness  The patient presents for establishment of care.    The patient, previously under the care of Dr. Hammer, is currently on metformin, administered twice daily, to manage his diabetes. He has made significant dietary modifications.   His current medication regimen includes metformin, Viagra, vitamin D3, vitamin C, multivitamins, and omega-3.   He has no concerns about sexually transmitted infections. Apart from diabetes, he reports no other significant medical conditions or health concerns. He has no surgical history. He has undergone colon cancer screening in the past, which yielded normal results, declines additional testing. He has experienced a weight loss of approximately 30 to 40 pounds since his last visit with Dr. Hammer, which he attributes to regular exercise and walking. He denies any changes in hearing, gastroesophageal reflux, or heartburn. His bowel movements are regular.   He works at [employer].   His sister had breast cancer. He denies any family history of ovarian, tubal, or colon cancer. He denies any family history of heart disease or stroke. His wife has hypertension. His mother has diabetes.   He denies any allergies to foods or medications.   He received his influenza vaccine 3 or 4 months ago.    Problem   Ed (Erectile Dysfunction)   Type 2 Diabetes Mellitus Without Complication, Without  "Long-Term Current Use of Insulin (Hcc)    This is a chronic condition.  Current medications:  Insulin:   Biguanide:   GLP1-RA: Metformin 1000 mg BID  SGLT-2i:      DPP4-I:   TZD:   Jame:  Sulfonyluria:     Last A1c: 6.4% 4/4/24  Last Microalb/Cr ratio: 4/4/24  Fasting sugars:  Last diabetic foot exam: 4/4/24  Last retinal eye exam: 4/4/24  ACEi/ARB? consider  Statin?   Aspirin?   Concomitant HTN?   Nightly foot checks? encouraged       Obesity (Bmi 30.0-34.9) (Resolved)     Health Maintenance:   Cancer screening:   Colorectal cancer screening: declines     Infectious disease screening/Immunizaitons  -STI screening: declines  Practices safe sex.    -Immunizaitons:   Influenza: recommend annually  Hep B, Covid, Singles, PNA: recommended     ROS:  Review of Systems   All other systems reviewed and are negative.      Objective:     Exam:  BP (!) 142/92 (BP Location: Left arm, Patient Position: Sitting, BP Cuff Size: Adult)   Pulse 87   Temp 36.4 °C (97.6 °F) (Temporal)   Resp 20   Ht 1.676 m (5' 6\")   Wt 71.2 kg (157 lb)   SpO2 100%   BMI 25.34 kg/m²  Body mass index is 25.34 kg/m².    Physical Exam  Vitals reviewed.   Constitutional:       General: He is not in acute distress.     Appearance: Normal appearance. He is obese. He is not ill-appearing.   HENT:      Head: Normocephalic and atraumatic.      Right Ear: Tympanic membrane, ear canal and external ear normal.      Left Ear: Tympanic membrane, ear canal and external ear normal.      Nose: Nose normal.      Mouth/Throat:      Mouth: Mucous membranes are moist.      Pharynx: Oropharynx is clear.   Eyes:      Extraocular Movements: Extraocular movements intact.      Conjunctiva/sclera: Conjunctivae normal.      Pupils: Pupils are equal, round, and reactive to light.   Neck:      Thyroid: No thyromegaly.      Trachea: Trachea normal.   Cardiovascular:      Rate and Rhythm: Normal rate and regular rhythm.      Pulses: Normal pulses.      Heart sounds: Normal " heart sounds. No murmur heard.  Pulmonary:      Effort: Pulmonary effort is normal. No respiratory distress.      Breath sounds: Normal breath sounds. No wheezing.   Abdominal:      General: Bowel sounds are normal.      Palpations: Abdomen is soft.   Musculoskeletal:         General: No swelling, tenderness or deformity. Normal range of motion.      Cervical back: Full passive range of motion without pain.   Feet:      Comments: Monofilament testing with a 10 gram force: sensation intact: intact bilaterally  Visual Inspection: Feet without maceration, ulcers, fissures.  Pedal pulses: intact bilaterally    Lymphadenopathy:      Cervical: No cervical adenopathy.   Skin:     General: Skin is warm and dry.      Capillary Refill: Capillary refill takes less than 2 seconds.   Neurological:      General: No focal deficit present.      Mental Status: He is alert and oriented to person, place, and time.      Cranial Nerves: No cranial nerve deficit.      Sensory: No sensory deficit.      Motor: No weakness.   Psychiatric:         Mood and Affect: Mood normal.         Behavior: Behavior normal.         Assessment & Plan:     58 y.o. male with the following -     Assessment & Plan  1. Diabetes Mellitus.  The patient's diabetes is currently well-managed with metformin, as evidenced by an A1c level of 6.4 today. Today, a retinal examination will be conducted. Additionally, a urine test will be performed to assess kidney function. Prescriptions for metformin have been renewed.    2. Hypertension.  The patient's blood pressure is slightly elevated, with a target blood pressure of less than 130/80. Given the patient's elevated risk of heart attack and stroke associated with diabetes, it is crucial to maintain control over his blood pressure and cholesterol levels. A recheck of his blood pressure will be conducted.    3. Health maintenance.  The patient was offered the hepatitis B vaccine, which he declined. A stool test was  offered, which he declined. Updated labs have been ordered to assess kidney and liver function, electrolytes, and thyroid levels.    Follow-up  The patient is scheduled for a follow-up visit in a few weeks.    Problem List Items Addressed This Visit       Type 2 diabetes mellitus without complication, without long-term current use of insulin (HCC)     Chronic, stable. Will get updated labs, poct A1c, retinal exam, uacr, monofilament today  Continue metformin 1000 mg BID         Relevant Medications    metformin (GLUCOPHAGE) 1000 MG tablet    Other Relevant Orders    POCT A1C    POCT Retinal Eye Exam    Microalbumin Creat Ratio Urine - Clinic Collect    Diabetic Monofilament Lower Extremity Exam (Completed)    Comp Metabolic Panel    Lipid Profile    ED (erectile dysfunction)     Chronic, stable. Continue sildenafil 50 mg as needed.         Relevant Medications    sildenafil citrate (VIAGRA) 50 MG tablet     Other Visit Diagnoses       Encounter to establish care        Need for hepatitis C screening test        Relevant Orders    HEP C VIRUS ANTIBODY    Vitamin D deficiency        Relevant Orders    VITAMIN D,25 HYDROXY (DEFICIENCY)    Thyroid disorder screen        Relevant Orders    TSH WITH REFLEX TO FT4    Screening for deficiency anemia        Relevant Orders    CBC WITHOUT DIFFERENTIAL    Encounter for screening for malignant neoplasm of prostate        Relevant Orders    PROSTATE SPECIFIC AG SCREENING          Patient was educated in proper administration of medication(s) ordered today including safety, possible SE, risks, benefits, rationale and alternatives to therapy.   Supportive care, differential diagnoses, and indications for immediate follow-up discussed with patient.    Pathogenesis of diagnosis discussed including typical length and natural progression.    Instructed to return to clinic or nearest emergency department for any change in condition, further concerns, or worsening of symptoms.  Patient  states understanding of the plan of care and discharge instructions.    Return in about 4 weeks (around 5/2/2024) for Labs, Blood Pressure.    Please note that this dictation was created using voice recognition software. I have made every reasonable attempt to correct obvious errors, but I expect that there are errors of grammar and possibly content that I did not discover before finalizing the note.

## 2024-07-11 ENCOUNTER — APPOINTMENT (OUTPATIENT)
Dept: MEDICAL GROUP | Facility: PHYSICIAN GROUP | Age: 59
End: 2024-07-11
Payer: COMMERCIAL

## 2024-09-19 ENCOUNTER — APPOINTMENT (OUTPATIENT)
Dept: MEDICAL GROUP | Facility: PHYSICIAN GROUP | Age: 59
End: 2024-09-19
Payer: COMMERCIAL

## 2024-10-23 DIAGNOSIS — H35.3130 BILATERAL NONEXUDATIVE AGE-RELATED MACULAR DEGENERATION, UNSPECIFIED STAGE: ICD-10-CM

## 2024-10-23 DIAGNOSIS — E11.9 TYPE 2 DIABETES MELLITUS WITHOUT COMPLICATION, WITHOUT LONG-TERM CURRENT USE OF INSULIN (HCC): ICD-10-CM

## 2024-10-23 DIAGNOSIS — E11.3219: ICD-10-CM

## 2024-12-14 ENCOUNTER — APPOINTMENT (OUTPATIENT)
Dept: RADIOLOGY | Facility: MEDICAL CENTER | Age: 59
End: 2024-12-14
Attending: STUDENT IN AN ORGANIZED HEALTH CARE EDUCATION/TRAINING PROGRAM
Payer: COMMERCIAL

## 2024-12-14 ENCOUNTER — HOSPITAL ENCOUNTER (EMERGENCY)
Facility: MEDICAL CENTER | Age: 59
End: 2024-12-14
Attending: STUDENT IN AN ORGANIZED HEALTH CARE EDUCATION/TRAINING PROGRAM
Payer: COMMERCIAL

## 2024-12-14 VITALS
HEIGHT: 66 IN | DIASTOLIC BLOOD PRESSURE: 92 MMHG | BODY MASS INDEX: 30.9 KG/M2 | TEMPERATURE: 98.7 F | WEIGHT: 192.24 LBS | RESPIRATION RATE: 16 BRPM | SYSTOLIC BLOOD PRESSURE: 185 MMHG | OXYGEN SATURATION: 96 % | HEART RATE: 82 BPM

## 2024-12-14 DIAGNOSIS — S52.551A OTHER CLOSED EXTRA-ARTICULAR FRACTURE OF DISTAL END OF RIGHT RADIUS, INITIAL ENCOUNTER: ICD-10-CM

## 2024-12-14 PROCEDURE — 73110 X-RAY EXAM OF WRIST: CPT | Mod: RT

## 2024-12-14 PROCEDURE — 29125 APPL SHORT ARM SPLINT STATIC: CPT

## 2024-12-14 PROCEDURE — A9270 NON-COVERED ITEM OR SERVICE: HCPCS | Performed by: STUDENT IN AN ORGANIZED HEALTH CARE EDUCATION/TRAINING PROGRAM

## 2024-12-14 PROCEDURE — 302875 HCHG BANDAGE ACE 4 OR 6""

## 2024-12-14 PROCEDURE — 302874 HCHG BANDAGE ACE 2 OR 3""

## 2024-12-14 PROCEDURE — 99284 EMERGENCY DEPT VISIT MOD MDM: CPT

## 2024-12-14 PROCEDURE — 700102 HCHG RX REV CODE 250 W/ 637 OVERRIDE(OP): Performed by: STUDENT IN AN ORGANIZED HEALTH CARE EDUCATION/TRAINING PROGRAM

## 2024-12-14 RX ORDER — ACETAMINOPHEN 325 MG/1
1000 TABLET ORAL EVERY 6 HOURS PRN
Qty: 30 TABLET | Refills: 0 | Status: SHIPPED | OUTPATIENT
Start: 2024-12-14

## 2024-12-14 RX ORDER — HYDROCODONE BITARTRATE AND ACETAMINOPHEN 5; 325 MG/1; MG/1
1 TABLET ORAL EVERY 6 HOURS PRN
Qty: 12 TABLET | Refills: 0 | Status: SHIPPED | OUTPATIENT
Start: 2024-12-14 | End: 2024-12-26

## 2024-12-14 RX ORDER — IBUPROFEN 400 MG/1
400 TABLET, FILM COATED ORAL EVERY 6 HOURS PRN
Qty: 30 TABLET | Refills: 0 | Status: SHIPPED | OUTPATIENT
Start: 2024-12-14

## 2024-12-14 RX ORDER — HYDROCODONE BITARTRATE AND ACETAMINOPHEN 5; 325 MG/1; MG/1
1 TABLET ORAL ONCE
Status: COMPLETED | OUTPATIENT
Start: 2024-12-14 | End: 2024-12-14

## 2024-12-14 RX ADMIN — HYDROCODONE BITARTRATE AND ACETAMINOPHEN 1 TABLET: 5; 325 TABLET ORAL at 18:30

## 2024-12-14 ASSESSMENT — PAIN DESCRIPTION - PAIN TYPE: TYPE: ACUTE PAIN

## 2024-12-14 NOTE — LETTER
"    EMPLOYEE’S CLAIM FOR COMPENSATION/ REPORT OF INITIAL TREATMENT  FORM C-4  PLEASE TYPE OR PRINT    EMPLOYEE’S CLAIM - PROVIDE ALL INFORMATION REQUESTED   First Name                    SONIA Bartlett                  Last Name  Cedric Birthdate                    1965                Sex  Male Claim Number (Insurer’s Use Only)     Home Address  [address] Age  59 y.o. Height  1.676 m (5' 6\") Weight  87.2 kg (192 lb 3.9 oz) Social Security Number     [facility] Zip  [address] Telephone  [phone] (home)    Mailing Address  [address] [facility] Zip  [address] Primary Language Spoken  English    INSURER   THIRD-PARTY   Ralph   Employee's Occupation (Job Title) When Injury or Occupational Disease Occurred      Employer's Name/Company Name  [employer] [place] [id number]  Telephone  [phone]    Office Mail Address (Number and Street)   [address] [address]     Date of Injury (if applicable) 12/14/2024               Hours Injury (if applicable)  3:45 PM Date Employer Notified  12/14/2024 Last Day of Work after Injury or Occupational Disease  12/14/2024 Supervisor to Whom Injury Reported  Hannah Lopez   Address or Location of Accident (if applicable)  Work [1]   What were you doing at the time of accident? (if applicable)  Heading to the backroom,tripped on the pallet    How did this injury or occupational disease occur? (Be specific and answer in detail. Use additional sheet if necessary)  I was heasing to the backroom for Break, there was a t.v pallet in middle of the asile and I tripped on it and fell hands first.   If you believe that you have an occupational disease, when did you first have knowledge of the disability and its relationship to your employment?  N/A Witnesses to the Accident (if applicable)  [place]      Nature of Injury or Occupational " Disease  Workers' Compensation  Part(s) of Body Injured or Affected  Hand (R) N/A N/A    I CERTIFY THAT THE ABOVE IS TRUE AND CORRECT TO T HE BEST OF MY KNOWLEDGE AND THAT I HAVE PROVIDED THIS INFORMATION IN ORDER TO OBTAIN THE BENEFITS OF [place] OR [place]  I HEREBY AUTHORIZE ANY PHYSICIAN, CHIROPRACTOR, SURGEON, PRACTITIONER OR ANY OTHER PERSON, ANY HOSPITAL, INCLUDING [facility] OR [facility], ANY  MEDICAL SERVICE ORGANIZATION, ANY INSURANCE COMPANY, OR OTHER INSTITUTION OR ORGANIZATION TO RELEASE TO EACH OTHER, ANY MEDICAL OR OTHER INFORMATION, INCLUDING BENEFITS PAID OR PAYABLE, PERTINENT TO THIS INJURY OR DISEASE, EXCEPT INFORMATION RELATIVE TO DIAGNOSIS, TREATMENT AND/OR COUNSELING FOR AIDS, PSYCHOLOGICAL CONDITIONS, ALCOHOL OR CONTROLLED SUBSTANCES, FOR WHICH I MUST GIVE SPECIFIC AUTHORIZATION.  A PHOTOSTAT OF THIS AUTHORIZATION SHALL BE VALID AS THE ORIGINAL.     Date 12/14/2024   Place [facility] Employee’s Original or  *Electronic Signature   THIS REPORT MUST BE COMPLETED AND MAILED WITHIN 3 WORKING DAYS OF TREATMENT   Place  [facility], EMERGENCY DEPT    Name of Facility      Date 12/14/2024 Diagnosis and Description of Injury or Occupational Disease  (S52.551A) Other closed extra-articular fracture of distal end of right radius, initial encounter  The encounter diagnosis was Other closed extra-articular fracture of distal end of right radius, initial encounter. Is there evidence that the injured employee was under the influence of alcohol and/or another controlled substance at the time of accident?  [x]No  [] Yes (if yes, please explain)   Hour 19:45  No   Treatment: Splint placed for right distal radius fracture    Have you advised the patient to remain off work five days or more?   [] Yes Indicate dates: From   To    [x] No      If no, is the injured employee capable  of: [] full duty [x] modified duty                     If modified duty, specify any limitations / restrictions:  Patient is in a splint in the right upper extremity, unable to use his right arm.                                                                                                                                                                                                                                                                                                                                                                                                               X-Ray Findings: Positive  Comments:Right distal radius fracture    From information given by the employee, together with medical evidence, can you directly connect this injury or occupational disease as job incurred?  [x]Yes   [] No Yes    Is additional medical care by a physician indicated? [x]Yes [] No  Yes  Comments:Follow-up with orthopedic surgery    Do you know of any previous injury or disease contributing to this condition or occupational disease? []Yes [x] No (Explain if yes)                          No   Date  12/14/2024 Print Health Care Provider’s Name  Maximo Wood I certify that the employer’s copy of  this form was delivered to the employer on:   Address   [address] [place] [address]  INSURER'S USE ONLY                       [facility]   [id number] Provider’s Tax ID Number   [id number]   Telephone  Dept: [phone]    Health Care Provider’s Original or Electronic Signature  e-MAXIMO Rahman D.O. Degree (MD,DO, DC,PA-C,APRN)  DO  Choose (if applicable)      ORIGINAL - TREATING HEALTHCARE PROVIDER PAGE 2 - INSURER/TPA PAGE 3 - EMPLOYER PAGE 4 - EMPLOYEE             Form C-4 (rev.08/23)

## 2024-12-15 NOTE — ED NOTES
RUE sugar tong applied per ERP. CMS intact before and after application. Pt tolerated procedure well.

## 2024-12-15 NOTE — ED PROVIDER NOTES
ED Provider Note    CHIEF COMPLAINT  Chief Complaint   Patient presents with    T-5000 FALL     MGLF onto right wrist, denies head strike, c/o 9/10 right wrist pain, CMS intact, arrives in sling       EXTERNAL RECORDS REVIEWED  outpatient family medicine note reviewed for medical history    HPI/ROS  LIMITATION TO HISTORY   Select: : None  OUTSIDE HISTORIAN(S):  Son at bedside providing clinically relevant collateral history     Dhruv Steele is a 59 y.o. male  right-hand-dominant past medical history of diabetes obesity presenting to the emergency department for right wrist pain.  Patient was at work at [employer], fell onto an outstretched right wrist.  Complaining of right distal forearm pain.  No numbness weakness tingling in the involved extremity.  No head strike no neck pain.  No other injury.      PAST MEDICAL HISTORY   has a past medical history of Diabetes (HCC) and Obesity (BMI 30.0-34.9).    SURGICAL HISTORY  patient denies any surgical history    FAMILY HISTORY  Family History   Problem Relation Age of Onset    Diabetes Mother     Lung Disease Father         asthma    Breast Cancer Sister     Cancer Sister     Diabetes Sister     Cancer Brother         unknown    Diabetes Brother     Lung Disease Maternal Grandmother         asthma    Ovarian Cancer Neg Hx     Tubal Cancer Neg Hx     Peritoneal Cancer Neg Hx     Colorectal Cancer Neg Hx     Heart Disease Neg Hx     Hypertension Neg Hx     Stroke Neg Hx     Hyperlipidemia Neg Hx        SOCIAL HISTORY  Social History     Tobacco Use    Smoking status: Never    Smokeless tobacco: Never   Vaping Use    Vaping status: Never Used   Substance and Sexual Activity    Alcohol use: No    Drug use: No    Sexual activity: Not on file       CURRENT MEDICATIONS  Home Medications       Reviewed by Fiorella Pollard R.N. (Registered Nurse) on 12/14/24 at 1713  Med List Status: Complete     Medication Last Dose Status   Ascorbic Acid (VITAMIN C) 1000 MG Tab  Active  "  DHA-EPA-Vitamin E (OMEGA-3 COMPLEX PO)  Active   metformin (GLUCOPHAGE) 1000 MG tablet  Active   multivitamin Tab  Active   sildenafil citrate (VIAGRA) 50 MG tablet  Active   vitamin D3 (CHOLECALCIFEROL) 1000 Unit (25 mcg) Tab  Active                  Audit from Redirected Encounters    **Home medications have not yet been reviewed for this encounter**         ALLERGIES  No Known Allergies    PHYSICAL EXAM  VITAL SIGNS: BP (!) 185/92   Pulse 82   Temp 37.1 °C (98.7 °F) (Temporal)   Resp 16   Ht 1.676 m (5' 6\")   Wt 87.2 kg (192 lb 3.9 oz)   SpO2 96%   BMI 31.03 kg/m²    General: Well- appearing , non-toxic, no acute distress  Neuro: oriented x 3, moving all extremities.   HEENT:   - Head: Normocephalic, atraumatic  - Eyes: PERRL  - Ears/Nose: normal external nose and ears  - Mouth: Normal external exam  Resp: no increased work of breathing  CV: Perfusing well  Abd: Not vomiting, no apparent abdominal discomfort  Extremities:   Right upper extremity: Distal forearm tenderness palpation.  Mild swelling over the proximal wrist.  No snuffbox tenderness.  No open skin laceration.  And PIN ulnar motor distribution intact.  Sensation tact light touch in all aspects of the hand.  Skin: Not diaphoretic  Psych: lucid and conversational             DIAGNOSTIC STUDIES / PROCEDURES    EKG  My independent EKG interpretation:  No results found for this or any previous visit.    LABS  Results for orders placed or performed during the hospital encounter of 04/04/24   Microalbumin Creat Ratio Urine - Clinic Collect    Collection Time: 04/04/24 11:15 AM   Result Value Ref Range    Creatinine, Urine 95.08 mg/dL    Microalbumin, Urine Random <1.2 mg/dL    Micro Alb Creat Ratio see below 0 - 30 mg/g       RADIOLOGY  I have independently interpreted the diagnostic imaging associated with this visit and am waiting the final reading from the radiologist.   My preliminary interpretation is as follows:   -   Radiologist " interpretation:   DX-WRIST-COMPLETE 3+ RIGHT   Final Result      Distal radial metaphyseal fracture with mild impaction.                 MEDICAL DECISION MAKING      ED COURSE AND PLAN    Dhruv Steele is a 59 y.o. male presenting to the emergency department for right wrist pain.  No snuffbox tenderness.  Plan for plain film of the right wrist.  Treated with hydrocodone for pain.    ---Pertinent ED Course---:    6:16 PM I reviewed the patient's old records in Epic, medication list, allergies, past medical history and performed a physical examination.     6:30 PM plain film of the right wrist shows an impacted distal radius fracture, no angulation, no indication for reduction, manipulation.  Sugar-tong splint was ordered for the right wrist.    7:00 PM reevaluated the patient, sugar-tong splint was appropriately applied by my tech.  Patient is neurovascularly intact in the right upper extremity after application of splint.  Appropriate for discharge home, strict return precautions discussed.  Referred to orthopedic surgery outpatient.  Prescribed several tablets of Norco to help with breakthrough pain, advised on Tylenol ibuprofen for baseline pain control.      Narcotics Script: In prescribing controlled substances to this patient, I certify that I have obtained and reviewed the medical history of Dhruv Steele. I have also made a good bernardo effort to obtain applicable records from other providers who have treated the patient and records did not demonstrate any increased risk of substance abuse that would prevent me from prescribing controlled substances.     I have conducted a physical exam and documented it. I have reviewed Mr. Steele’s prescription history as maintained by the [place] Prescription Monitoring Program.     I have assessed the patient’s risk for abuse, dependency, and addiction using the validated Opioid Risk Tool available at https://www.mdcalc.com/xxgwzd-pinz-xbtq-ort-narcotic-abuse.     Given the  above, I believe the benefits of controlled substance therapy outweigh the risks. The reasons for prescribing controlled substances include non-narcotic, oral analgesic alternatives have been inadequate for pain control. Accordingly, I have discussed the risk and benefits, treatment plan, and alternative therapies with the patient.     Procedures:      ----------------------------------------------------------------------------------  DISCUSSIONS    I have discussed management of the patient with the following physicians and HARLEY's:      Discussion of management with other [facility] or appropriate source(s):     Escalation of care considered, and ultimately not performed:     Barriers to care at this time, including but not limited to:     Decision tools and prescription drugs considered including, but not limited to: Prescribed Norco, Tylenol ibuprofen.    FINAL IMPRESSION    1. Other closed extra-articular fracture of distal end of right radius, initial encounter        New Prescriptions    ACETAMINOPHEN (TYLENOL) 325 MG TAB    Take 3 Tablets by mouth every 6 hours as needed for Mild Pain.    HYDROCODONE-ACETAMINOPHEN (NORCO) 5-325 MG TAB PER TABLET    Take 1 Tablet by mouth every 6 hours as needed (pain) for up to 12 days.    IBUPROFEN (MOTRIN) 400 MG TAB    Take 1 Tablet by mouth every 6 hours as needed for Moderate Pain.         DISPOSITION    Discharge home, Stable      This chart was dictated using an electronic voice recognition software. The chart has been reviewed and edited but there is still possibility for dictation errors due to limitation of software.    Maximo Wood,  12/14/2024

## 2024-12-15 NOTE — ED NOTES
"Chief Complaint   Patient presents with    T-5000 FALL     MGLF onto right wrist, denies head strike, c/o 9/10 right wrist pain, CMS intact, arrives in sling     BP (!) 168/105   Pulse 92   Temp 36.6 °C (97.9 °F) (Temporal)   Resp 16   Ht 1.676 m (5' 6\")   Wt 87.2 kg (192 lb 3.9 oz)   SpO2 99%   BMI 31.03 kg/m²     Ambulatory to triage for above.    "

## 2024-12-15 NOTE — DISCHARGE INSTRUCTIONS
You were seen in the emergency department for a fracture of your right distal radius.     You were placed in a splint in the emergency department.    Please take Tylenol 1000 mg and ibuprofen 400 mg every 6 hours to help with the pain.  Keep the extremity elevated, apply ice intermittently to help with the swelling.    You will likely have pain over the next several days but the pain in your extremity should not get markedly worse.  If you develop a precipitous increase in the pain in your affected extremity, or you develop numbness or tingling, come back for reevaluation.     You have been referred to orthopedic surgery.  Call the number below to make an appointment.    [facility]  [address] [place] [id number] [phone]

## 2024-12-23 DIAGNOSIS — E11.9 TYPE 2 DIABETES MELLITUS WITHOUT COMPLICATION, WITHOUT LONG-TERM CURRENT USE OF INSULIN (HCC): ICD-10-CM

## 2024-12-23 NOTE — TELEPHONE ENCOUNTER
Received request via: Patient    Was the patient seen in the last year in this department? Yes    Does the patient have an active prescription (recently filled or refills available) for medication(s) requested? No    Pharmacy Name: Pharmacy:   [id number] - [facility] [place] - [address] [place] [address]     Does the patient have halfway Plus and need 100-day supply? (This applies to ALL medications) Patient does not have SCP

## 2025-04-28 DIAGNOSIS — E11.9 TYPE 2 DIABETES MELLITUS WITHOUT COMPLICATION, WITHOUT LONG-TERM CURRENT USE OF INSULIN (HCC): ICD-10-CM

## 2025-04-28 NOTE — TELEPHONE ENCOUNTER
Received request via: Pharmacy    Was the patient seen in the last year in this department? No    Does the patient have an active prescription (recently filled or refills available) for medication(s) requested? No    Pharmacy Name:   [facility] [address] [place] [address] - [address] [address]  Phone: [phone] Fax: [phone]       Does the patient have intermediate Plus and need 100-day supply? (This applies to ALL medications) Patient does not have SCP

## 2025-07-09 DIAGNOSIS — E11.9 TYPE 2 DIABETES MELLITUS WITHOUT COMPLICATION, WITHOUT LONG-TERM CURRENT USE OF INSULIN (HCC): ICD-10-CM

## 2025-07-10 NOTE — TELEPHONE ENCOUNTER
PLEASE CONTACT PATIENT. A short refill has been provided, but they need an appointment for any future refills.

## 2025-10-23 ENCOUNTER — APPOINTMENT (OUTPATIENT)
Dept: MEDICAL GROUP | Facility: PHYSICIAN GROUP | Age: 60
End: 2025-10-23
Payer: COMMERCIAL